# Patient Record
Sex: MALE | Race: WHITE | Employment: FULL TIME | URBAN - METROPOLITAN AREA
[De-identification: names, ages, dates, MRNs, and addresses within clinical notes are randomized per-mention and may not be internally consistent; named-entity substitution may affect disease eponyms.]

---

## 2020-07-16 ENCOUNTER — EMPLOYEE WELLNESS (OUTPATIENT)
Dept: OTHER | Age: 30
End: 2020-07-16

## 2020-07-16 ENCOUNTER — OFFICE VISIT (OUTPATIENT)
Dept: FAMILY MEDICINE CLINIC | Age: 30
End: 2020-07-16
Payer: COMMERCIAL

## 2020-07-16 VITALS
BODY MASS INDEX: 30.49 KG/M2 | OXYGEN SATURATION: 98 % | HEART RATE: 66 BPM | TEMPERATURE: 97.3 F | SYSTOLIC BLOOD PRESSURE: 102 MMHG | DIASTOLIC BLOOD PRESSURE: 68 MMHG | WEIGHT: 201.2 LBS | RESPIRATION RATE: 16 BRPM | HEIGHT: 68 IN

## 2020-07-16 LAB
CHOLESTEROL, TOTAL: 152 MG/DL (ref 0–199)
FASTING: YES
GLUCOSE BLD-MCNC: 95 MG/DL (ref 74–109)
HDLC SERPL-MCNC: 51 MG/DL (ref 40–90)
LDL CHOLESTEROL CALCULATED: 86 MG/DL
TRIGL SERPL-MCNC: 73 MG/DL (ref 0–199)

## 2020-07-16 PROCEDURE — 99202 OFFICE O/P NEW SF 15 MIN: CPT | Performed by: FAMILY MEDICINE

## 2020-07-16 RX ORDER — ACETAMINOPHEN 160 MG
TABLET,DISINTEGRATING ORAL DAILY
COMMUNITY

## 2020-07-16 RX ORDER — CHLORAL HYDRATE 500 MG
3000 CAPSULE ORAL 3 TIMES DAILY
COMMUNITY
End: 2021-03-22 | Stop reason: ALTCHOICE

## 2020-07-16 RX ORDER — FLUOXETINE HYDROCHLORIDE 40 MG/1
40 CAPSULE ORAL DAILY
COMMUNITY
End: 2020-07-16 | Stop reason: SDUPTHER

## 2020-07-16 RX ORDER — FLUOXETINE HYDROCHLORIDE 40 MG/1
40 CAPSULE ORAL DAILY
Qty: 90 CAPSULE | Refills: 3 | Status: SHIPPED | OUTPATIENT
Start: 2020-07-16 | End: 2021-08-09

## 2020-07-16 SDOH — ECONOMIC STABILITY: FOOD INSECURITY: WITHIN THE PAST 12 MONTHS, YOU WORRIED THAT YOUR FOOD WOULD RUN OUT BEFORE YOU GOT MONEY TO BUY MORE.: NEVER TRUE

## 2020-07-16 SDOH — ECONOMIC STABILITY: INCOME INSECURITY: HOW HARD IS IT FOR YOU TO PAY FOR THE VERY BASICS LIKE FOOD, HOUSING, MEDICAL CARE, AND HEATING?: NOT HARD AT ALL

## 2020-07-16 SDOH — ECONOMIC STABILITY: FOOD INSECURITY: WITHIN THE PAST 12 MONTHS, THE FOOD YOU BOUGHT JUST DIDN'T LAST AND YOU DIDN'T HAVE MONEY TO GET MORE.: NEVER TRUE

## 2020-07-16 SDOH — ECONOMIC STABILITY: TRANSPORTATION INSECURITY
IN THE PAST 12 MONTHS, HAS THE LACK OF TRANSPORTATION KEPT YOU FROM MEDICAL APPOINTMENTS OR FROM GETTING MEDICATIONS?: NO

## 2020-07-16 SDOH — ECONOMIC STABILITY: TRANSPORTATION INSECURITY
IN THE PAST 12 MONTHS, HAS LACK OF TRANSPORTATION KEPT YOU FROM MEETINGS, WORK, OR FROM GETTING THINGS NEEDED FOR DAILY LIVING?: NO

## 2020-07-16 ASSESSMENT — ENCOUNTER SYMPTOMS
COUGH: 0
SHORTNESS OF BREATH: 0
RHINORRHEA: 0
NAUSEA: 0
VOMITING: 0
BACK PAIN: 0
DIARRHEA: 0

## 2020-07-16 ASSESSMENT — PATIENT HEALTH QUESTIONNAIRE - PHQ9
2. FEELING DOWN, DEPRESSED OR HOPELESS: 0
SUM OF ALL RESPONSES TO PHQ QUESTIONS 1-9: 0
SUM OF ALL RESPONSES TO PHQ QUESTIONS 1-9: 0
1. LITTLE INTEREST OR PLEASURE IN DOING THINGS: 0
SUM OF ALL RESPONSES TO PHQ9 QUESTIONS 1 & 2: 0

## 2020-07-16 NOTE — PROGRESS NOTES
2020    Jonel Mejia (:  1990) is a 30 y.o. male, here for evaluation of the following medical concerns:    HPI  Patient has he has a history of generalized anxiety. Takes fluoxetine 40 mg p.o. daily. Feels like this is controlled. Recently moved to the area. Establishing. Review of Systems   Constitutional: Negative for chills and fever. HENT: Negative for ear pain and rhinorrhea. Respiratory: Negative for cough and shortness of breath. Cardiovascular: Negative for chest pain and palpitations. Gastrointestinal: Negative for diarrhea, nausea and vomiting. Endocrine: Negative for cold intolerance and polyuria. Genitourinary: Negative for dysuria and frequency. Musculoskeletal: Negative for arthralgias and back pain. Skin: Negative for rash and wound. Neurological: Negative for weakness and numbness. Hematological: Negative for adenopathy. Does not bruise/bleed easily. Psychiatric/Behavioral: Negative for sleep disturbance. The patient is not nervous/anxious. Prior to Visit Medications    Medication Sig Taking? Authorizing Provider   CREATINE PO Take by mouth Yes Historical Provider, MD   Omega-3 Fatty Acids (FISH OIL) 1000 MG CAPS Take 3,000 mg by mouth 3 times daily Yes Historical Provider, MD   Multiple Vitamin (MULTI VITAMIN DAILY PO) Take by mouth Yes Historical Provider, MD   Cholecalciferol (VITAMIN D3) 50 MCG ( UT) CAPS Take by mouth Yes Historical Provider, MD   ALANINE PO Take by mouth Yes Historical Provider, MD   FLUoxetine (PROZAC) 40 MG capsule Take 1 capsule by mouth daily Yes Silvestre Cardenas DO        No Known Allergies    Past Medical History:   Diagnosis Date    Anxiety     Depression     History of chicken pox        History reviewed. No pertinent surgical history.     Social History     Socioeconomic History    Marital status: Single     Spouse name: Not on file    Number of children: 0    Years of education: Not on file    Highest education level: Doctorate   Occupational History    Not on file   Social Needs    Financial resource strain: Not hard at all   Nicki-Charline insecurity     Worry: Never true     Inability: Never true   [employer] needs     Medical: No     Non-medical: No   Tobacco Use    Smoking status: Never Smoker    Smokeless tobacco: Never Used   Substance and Sexual Activity    Alcohol use: Yes     Comment: occasional    Drug use: Never    Sexual activity: Not on file   Lifestyle    Physical activity     Days per week: Not on file     Minutes per session: Not on file    Stress: Not on file   Relationships    Social connections     Talks on phone: Not on file     Gets together: Not on file     Attends Gnosticist service: Not on file     Active member of club or organization: Not on file     Attends meetings of clubs or organizations: Not on file     Relationship status: Not on file    Intimate partner violence     Fear of current or ex partner: Not on file     Emotionally abused: Not on file     Physically abused: Not on file     Forced sexual activity: Not on file   Other Topics Concern    Not on file   Social History Narrative    Not on file        Family History   Problem Relation Age of Onset    No Known Problems Father     Cancer Paternal Grandfather         melanoma       Vitals:    07/16/20 1016   BP: 102/68   Site: Left Upper Arm   Pulse: 66   Resp: 16   Temp: 97.3 °F (36.3 °C)   TempSrc: Temporal   SpO2: 98%   Weight: 201 lb 3.2 oz (91.3 kg)   Height: 5' 8.11\" (1.73 m)     Estimated body mass index is 30.49 kg/m² as calculated from the following:    Height as of this encounter: 5' 8.11\" (1.73 m). Weight as of this encounter: 201 lb 3.2 oz (91.3 kg). Physical Exam  Constitutional:       Appearance: He is well-developed. HENT:      Head: Normocephalic and atraumatic.       Right Ear: External ear normal.      Left Ear: External ear normal.      Nose: Nose normal.   Eyes:      Pupils: Pupils are equal, round, and reactive to light. Neck:      Musculoskeletal: Normal range of motion and neck supple. Cardiovascular:      Rate and Rhythm: Normal rate and regular rhythm. Heart sounds: Normal heart sounds. Pulmonary:      Effort: Pulmonary effort is normal.      Breath sounds: Normal breath sounds. Abdominal:      General: There is no distension. Palpations: Abdomen is soft. Tenderness: There is no abdominal tenderness. Musculoskeletal: Normal range of motion. Skin:     General: Skin is warm and dry. Neurological:      Mental Status: He is alert and oriented to person, place, and time. Psychiatric:         Behavior: Behavior normal.         Thought Content: Thought content normal.         ASSESSMENT/PLAN:  1. Annual physical exam  Controlled. Continue Prozac 40 mg p.o. daily. Follow-up 6 months    2. Anxiety  Doing well      Return in about 6 months (around 1/16/2021). An  electronic signature was used to authenticate this note.     --Cisco Bruno DO on 7/16/2020 at 10:29 AM

## 2020-10-19 VITALS — BODY MASS INDEX: 30.46 KG/M2 | WEIGHT: 201 LBS

## 2020-12-22 ENCOUNTER — TELEPHONE (OUTPATIENT)
Dept: FAMILY MEDICINE CLINIC | Age: 30
End: 2020-12-22

## 2020-12-22 ENCOUNTER — VIRTUAL VISIT (OUTPATIENT)
Dept: FAMILY MEDICINE CLINIC | Age: 30
End: 2020-12-22
Payer: COMMERCIAL

## 2020-12-22 PROCEDURE — 99213 OFFICE O/P EST LOW 20 MIN: CPT | Performed by: NURSE PRACTITIONER

## 2020-12-22 ASSESSMENT — ENCOUNTER SYMPTOMS
BLOOD IN STOOL: 0
EYE DISCHARGE: 0
COUGH: 0
CONSTIPATION: 0
ABDOMINAL DISTENTION: 0
RHINORRHEA: 0
ANAL BLEEDING: 0
COLOR CHANGE: 0
SORE THROAT: 0
EYE REDNESS: 0
ABDOMINAL PAIN: 0
NAUSEA: 0
DIARRHEA: 0
SHORTNESS OF BREATH: 0

## 2020-12-22 NOTE — LETTER
[address] [address],[address] [facility] [address]  Phone: [phone]  Fax: [phone]    VITOR Garza CNP        December 22, 2020     Patient: Sreekanth Zayas   YOB: 1990   Date of Visit: 12/22/2020       To Whom It May Concern: It is my medical opinion that Sreekanth Zayas may return to work on 12/24/2020. If you have any questions or concerns, please don't hesitate to call.     Sincerely,        VITOR Garza CNP

## 2020-12-22 NOTE — PROGRESS NOTES
[address] [facility]  [phone] (phone)  [phone] (fax)    Visit Date: 12/22/2020    Manuel Manrique is a 35 y.o. male who presents today for:  Chief Complaint   Patient presents with    Letter for School/Work     HPI:     THIS VISIT WAS PERFORMED VIA A SYNCHRONOUS TELECOMMUNICATION SYSTEM. Patient gave consent for synchronous telecommunication visit during YBIPT-54 public health emergency. I was present in my home utilizing EPIC patient was in their home. Visit was started at 37    Needs return to work letter - positive COVID 12/14/2020. Symptoms started 12/13/2020    Has been fever free without the aid of medication. Still having slight congestion. HPI  Health Maintenance   Topic Date Due    Varicella vaccine (1 of 2 - 2-dose childhood series) 09/13/1992    HIV screen  09/13/2006    DTaP/Tdap/Td vaccine (1 - Tdap) 09/13/2010    Flu vaccine (1) 09/01/2020    Hepatitis A vaccine  Aged Out    Hepatitis B vaccine  Aged Out    Hib vaccine  Aged Out    Meningococcal (ACWY) vaccine  Aged Out    Pneumococcal 0-64 years Vaccine  Aged Out     Past Medical History:   Diagnosis Date    Anxiety     Depression     History of chicken pox       History reviewed. No pertinent surgical history.   Family History   Problem Relation Age of Onset    No Known Problems Father     Cancer Paternal Grandfather         melanoma     Social History     Tobacco Use    Smoking status: Never Smoker    Smokeless tobacco: Never Used   Substance Use Topics    Alcohol use: Yes     Comment: occasional      Current Outpatient Medications   Medication Sig Dispense Refill    CREATINE PO Take by mouth      Omega-3 Fatty Acids (FISH OIL) 1000 MG CAPS Take 3,000 mg by mouth 3 times daily      Multiple Vitamin (MULTI VITAMIN DAILY PO) Take by mouth      Cholecalciferol (VITAMIN D3) 50 MCG (2000 UT) CAPS Take by mouth      ALANINE PO Take by mouth  FLUoxetine (PROZAC) 40 MG capsule Take 1 capsule by mouth daily 90 capsule 3     No current facility-administered medications for this visit. No Known Allergies    Subjective:    Review of Systems   Constitutional: Negative for chills, fatigue and fever. HENT: Positive for congestion. Negative for ear pain, postnasal drip, rhinorrhea and sore throat. Eyes: Negative for discharge and redness. Respiratory: Negative for cough and shortness of breath. Cardiovascular: Negative for chest pain and leg swelling. Gastrointestinal: Negative for abdominal distention, abdominal pain, anal bleeding, blood in stool, constipation, diarrhea and nausea. Skin: Negative for color change and rash. Neurological: Negative for facial asymmetry, speech difficulty and weakness. Hematological: Does not bruise/bleed easily. Psychiatric/Behavioral: Negative for agitation and confusion. Objective: There were no vitals filed for this visit. There is no height or weight on file to calculate BMI. Wt Readings from Last 3 Encounters:   07/16/20 201 lb (91.2 kg)   07/16/20 201 lb 3.2 oz (91.3 kg)     BP Readings from Last 3 Encounters:   07/16/20 102/68     Physical Exam  Vitals reviewed: Phone visit. Lab Results   Component Value Date    CHOL 152 07/16/2020    TRIG 73 07/16/2020    HDL 51 07/16/2020    LDLCALC 86 07/16/2020     Assessment:       Diagnosis Orders   1. COVID-19         Plan: Will give return to work note. Return if symptoms worsen or fail to improve. Orders Placed:  No orders of the defined types were placed in this encounter. Medications Prescribed:  No orders of the defined types were placed in this encounter. No future appointments. Patient given educational materials - see patient instructions. Discussed use, benefit, and side effects of prescribedmedications. All patient questions answered. Pt voiced understanding. Reviewed health maintenance. Instructed to continue current medications, diet and exercise. Patient agreed with treatment plan. Follow up as directed. Reyes Christianson is a 35 y.o. male being evaluated by a Virtual Visit (video visit) encounter to address concerns as mentioned above. A caregiver was present when appropriate. Due to this being a TeleHealth encounter (During QZEWN-45 public health emergency). Evaluation of the following organ systems was limited: Vitals/Constitutional/EENT/Resp/CV/GI//MS/Neuro/Skin/Heme-Lymph-Imm. Pursuant to the emergency declaration under the [address] [place] [address] authority and the [employer] Act, this Virtual Visit was conducted with patient's (and/or legal guardian's) consent, to reduce the patient's risk of exposure to COVID-19 and provide necessary medical care. The patient (and/or legal guardian) has also been advised to contact this office for worsening conditions or problems, and seek emergency medical treatment and/or call 911 if deemed necessary. Services were provided through a video synchronous discussion virtually to substitute for in-person clinic visit. Patient and provider were located at their individual homes. --VITOR Hathaway CNP on 12/22/2020 at 1:32 PM    An electronic signature was used to authenticate this note.      Electronically signed by VITOR Hathaway CNP on 12/22/2020 at 1:32 PM

## 2021-03-22 ENCOUNTER — HOSPITAL ENCOUNTER (OUTPATIENT)
Dept: GENERAL RADIOLOGY | Age: 31
Discharge: HOME | End: 2021-03-22
Payer: COMMERCIAL

## 2021-03-22 ENCOUNTER — OFFICE VISIT (OUTPATIENT)
Dept: FAMILY MEDICINE CLINIC | Age: 31
End: 2021-03-22
Payer: COMMERCIAL

## 2021-03-22 ENCOUNTER — HOSPITAL ENCOUNTER (OUTPATIENT)
Age: 31
Discharge: HOME | End: 2021-03-22
Payer: COMMERCIAL

## 2021-03-22 VITALS
DIASTOLIC BLOOD PRESSURE: 70 MMHG | WEIGHT: 224.4 LBS | BODY MASS INDEX: 34.01 KG/M2 | HEART RATE: 100 BPM | RESPIRATION RATE: 16 BRPM | SYSTOLIC BLOOD PRESSURE: 112 MMHG | OXYGEN SATURATION: 99 % | HEIGHT: 68 IN | TEMPERATURE: 97.9 F

## 2021-03-22 DIAGNOSIS — L98.9 SKIN LESION: ICD-10-CM

## 2021-03-22 DIAGNOSIS — S69.90XA INJURY OF WRIST, UNSPECIFIED LATERALITY, INITIAL ENCOUNTER: Primary | ICD-10-CM

## 2021-03-22 DIAGNOSIS — S69.90XA INJURY OF WRIST, UNSPECIFIED LATERALITY, INITIAL ENCOUNTER: ICD-10-CM

## 2021-03-22 PROCEDURE — 73110 X-RAY EXAM OF WRIST: CPT

## 2021-03-22 PROCEDURE — 99213 OFFICE O/P EST LOW 20 MIN: CPT | Performed by: FAMILY MEDICINE

## 2021-03-22 PROCEDURE — 96372 THER/PROPH/DIAG INJ SC/IM: CPT | Performed by: FAMILY MEDICINE

## 2021-03-22 RX ORDER — KETOROLAC TROMETHAMINE 30 MG/ML
30 INJECTION, SOLUTION INTRAMUSCULAR; INTRAVENOUS ONCE
Status: COMPLETED | OUTPATIENT
Start: 2021-03-22 | End: 2021-03-22

## 2021-03-22 RX ORDER — MELOXICAM 15 MG/1
15 TABLET ORAL DAILY
Qty: 30 TABLET | Refills: 3 | Status: SHIPPED | OUTPATIENT
Start: 2021-03-22 | End: 2021-10-18

## 2021-03-22 RX ADMIN — KETOROLAC TROMETHAMINE 30 MG: 30 INJECTION, SOLUTION INTRAMUSCULAR; INTRAVENOUS at 14:48

## 2021-03-22 ASSESSMENT — PATIENT HEALTH QUESTIONNAIRE - PHQ9
SUM OF ALL RESPONSES TO PHQ9 QUESTIONS 1 & 2: 0
SUM OF ALL RESPONSES TO PHQ QUESTIONS 1-9: 0

## 2021-03-22 NOTE — LETTER
[address] [address],[address] [facility] [address] [place] [address]  Phone: [phone]  Fax: [phone]    Khanh Crowe DO        March 22, 2021     Patient: Vale Willard   YOB: 1990   Date of Visit: 3/22/2021       To Whom it May Concern:    Vale Willard was seen in my clinic on 3/22/2021. He may return to work on 3/22/21 with restriction regarding left wrist and hand use. Should be limited to no activity with left hand involving pushing, pulling, or lifting. If you have any questions or concerns, please don't hesitate to call.     Sincerely,         Khanh Crowe DO

## 2021-03-22 NOTE — PROGRESS NOTES
After obtaining consent, and per orders of Dr. Madison Frazier, injection of Torodol was given IM in Right upper quad. gluteus by Maria Luisa Wilkerson. Patient tolerated well and was instructed to remain in clinic for 20 minutes afterwards, and to report any adverse reaction to me immediately. Melissa Rodas left office with no apparent reaction. Administrations This Visit     ketorolac (TORADOL) injection 30 mg     Admin Date  03/22/2021  14:48 Action  Given Dose  30 mg Route  Intramuscular Site  Dorsogluteal Right Administered By  Maria Luisa Wilkerson LPN    Ordering Provider: DO Berlin Akins [address]: [id number]    Lot#: [id number]    : [address] [facility]    Patient Supplied?: No    Comments: Patient tolerated well.  Given IM in Right Gluteal. Exp. 03/22

## 2021-03-22 NOTE — PROGRESS NOTES
Health Maintenance Due   Topic Date Due    Hepatitis C screen  Never done Pended    Varicella vaccine (1 of 2 - 2-dose childhood series) Never done    HIV screen  Never done Pended    COVID-19 Vaccine (1) Never done    DTaP/Tdap/Td vaccine (1 - Tdap) Never done    Flu vaccine (1) Never done

## 2021-03-22 NOTE — PROGRESS NOTES
Juan Soto (:  1990) is a 35 y.o. male,Established patient, here for evaluation of the following chief complaint(s):  Wrist Pain (Since fall on Saturday) and Nevus (check back of neck)      ASSESSMENT/PLAN:  1. Injury of wrist, unspecified laterality, initial encounterx-ray ordered. RICE therapy recommended. Mobic 15 g p.o. daily. .  2. Skin lesionskin tag. Reassured. Return if symptoms worsen or fail to improve. SUBJECTIVE/OBJECTIVE:  HPI  Patient reports wrist pain since a fall on Saturday. Outstretched left hand. Pain in the anterior left wrist.  Pain in the carpal tunnel area with swelling and tightness. Worse with movement. ASA is helping. Tylenol didn't help. Icing and heat. No other injuries or neurological symptoms. He also notes that he has a skin lesion on the back of his neck that he would like looked at. Present for years. Family member notes that it's changing. FH melanoma. Physical Exam  Swelling and ecchymosis over the anterior left wrist.  Some bony tenderness over the anterior wrist.  Normal strength but inhibited by pain. An electronic signature was used to authenticate this note.     --Salma Reyez, DO

## 2021-03-25 DIAGNOSIS — S62.102P CLOSED FRACTURE OF LEFT WRIST WITH MALUNION, SUBSEQUENT ENCOUNTER: Primary | ICD-10-CM

## 2021-04-07 ENCOUNTER — HOSPITAL ENCOUNTER (OUTPATIENT)
Dept: MRI IMAGING | Age: 31
Discharge: HOME | End: 2021-04-07
Payer: COMMERCIAL

## 2021-04-07 DIAGNOSIS — S52.512D DISPLACED FRACTURE OF LEFT RADIAL STYLOID PROCESS, SUBSEQUENT ENCOUNTER FOR CLOSED FRACTURE WITH ROUTINE HEALING: ICD-10-CM

## 2021-04-07 PROCEDURE — 73221 MRI JOINT UPR EXTREM W/O DYE: CPT

## 2021-08-09 RX ORDER — FLUOXETINE HYDROCHLORIDE 40 MG/1
CAPSULE ORAL
Qty: 90 CAPSULE | Refills: 3 | Status: SHIPPED | OUTPATIENT
Start: 2021-08-09 | End: 2022-02-14

## 2021-08-09 NOTE — TELEPHONE ENCOUNTER
Patient's last appointment was : 3/22/2021  Patient's next appointment is : Visit date not found  Last refilled: 7/16/2020

## 2021-10-18 ENCOUNTER — OFFICE VISIT (OUTPATIENT)
Dept: FAMILY MEDICINE CLINIC | Age: 31
End: 2021-10-18
Payer: COMMERCIAL

## 2021-10-18 ENCOUNTER — HOSPITAL ENCOUNTER (OUTPATIENT)
Age: 31
Discharge: HOME | End: 2021-10-18
Payer: COMMERCIAL

## 2021-10-18 VITALS
TEMPERATURE: 97.8 F | HEIGHT: 68 IN | DIASTOLIC BLOOD PRESSURE: 84 MMHG | BODY MASS INDEX: 34.34 KG/M2 | SYSTOLIC BLOOD PRESSURE: 122 MMHG | HEART RATE: 91 BPM | OXYGEN SATURATION: 98 % | WEIGHT: 226.6 LBS

## 2021-10-18 DIAGNOSIS — R53.83 FATIGUE, UNSPECIFIED TYPE: Primary | ICD-10-CM

## 2021-10-18 DIAGNOSIS — R63.5 WEIGHT GAIN: ICD-10-CM

## 2021-10-18 DIAGNOSIS — F41.9 ANXIETY: ICD-10-CM

## 2021-10-18 DIAGNOSIS — N52.9 ERECTILE DYSFUNCTION, UNSPECIFIED ERECTILE DYSFUNCTION TYPE: ICD-10-CM

## 2021-10-18 DIAGNOSIS — R53.83 FATIGUE, UNSPECIFIED TYPE: ICD-10-CM

## 2021-10-18 LAB
BASOPHILS # BLD: 0.3 %
BASOPHILS ABSOLUTE: 0 THOU/MM3 (ref 0–0.1)
EOSINOPHIL # BLD: 0.9 %
EOSINOPHILS ABSOLUTE: 0.1 THOU/MM3 (ref 0–0.4)
ERYTHROCYTE [DISTWIDTH] IN BLOOD BY AUTOMATED COUNT: 12.3 % (ref 11.5–14.5)
ERYTHROCYTE [DISTWIDTH] IN BLOOD BY AUTOMATED COUNT: 39.5 FL (ref 35–45)
HCT VFR BLD CALC: 43.9 % (ref 42–52)
HEMOGLOBIN: 15.4 GM/DL (ref 14–18)
HIV AG/AB: NONREACTIVE
IMMATURE GRANS (ABS): 0.03 THOU/MM3 (ref 0–0.07)
IMMATURE GRANULOCYTES: 0.3 %
LYMPHOCYTES # BLD: 30.9 %
LYMPHOCYTES ABSOLUTE: 3.1 THOU/MM3 (ref 1–4.8)
MCH RBC QN AUTO: 31.2 PG (ref 26–33)
MCHC RBC AUTO-ENTMCNC: 35.1 GM/DL (ref 32.2–35.5)
MCV RBC AUTO: 88.9 FL (ref 80–94)
MONOCYTES # BLD: 6.2 %
MONOCYTES ABSOLUTE: 0.6 THOU/MM3 (ref 0.4–1.3)
NUCLEATED RED BLOOD CELLS: 0 /100 WBC
PLATELET # BLD: 356 THOU/MM3 (ref 130–400)
PMV BLD AUTO: 9.1 FL (ref 9.4–12.4)
RBC # BLD: 4.94 MILL/MM3 (ref 4.7–6.1)
REASON FOR REJECTION: NORMAL
REJECTED TEST: NORMAL
SEG NEUTROPHILS: 61.4 %
SEGMENTED NEUTROPHILS ABSOLUTE COUNT: 6.1 THOU/MM3 (ref 1.8–7.7)
TSH SERPL DL<=0.05 MIU/L-ACNC: 2.06 UIU/ML (ref 0.4–4.2)
WBC # BLD: 9.9 THOU/MM3 (ref 4.8–10.8)

## 2021-10-18 PROCEDURE — 99214 OFFICE O/P EST MOD 30 MIN: CPT | Performed by: FAMILY MEDICINE

## 2021-10-18 PROCEDURE — 84443 ASSAY THYROID STIM HORMONE: CPT

## 2021-10-18 PROCEDURE — 87389 HIV-1 AG W/HIV-1&-2 AB AG IA: CPT

## 2021-10-18 PROCEDURE — 85025 COMPLETE CBC W/AUTO DIFF WBC: CPT

## 2021-10-18 PROCEDURE — 80053 COMPREHEN METABOLIC PANEL: CPT

## 2021-10-18 PROCEDURE — 36415 COLL VENOUS BLD VENIPUNCTURE: CPT

## 2021-10-18 SDOH — ECONOMIC STABILITY: FOOD INSECURITY: WITHIN THE PAST 12 MONTHS, THE FOOD YOU BOUGHT JUST DIDN'T LAST AND YOU DIDN'T HAVE MONEY TO GET MORE.: NEVER TRUE

## 2021-10-18 SDOH — ECONOMIC STABILITY: FOOD INSECURITY: WITHIN THE PAST 12 MONTHS, YOU WORRIED THAT YOUR FOOD WOULD RUN OUT BEFORE YOU GOT MONEY TO BUY MORE.: NEVER TRUE

## 2021-10-18 ASSESSMENT — SOCIAL DETERMINANTS OF HEALTH (SDOH): HOW HARD IS IT FOR YOU TO PAY FOR THE VERY BASICS LIKE FOOD, HOUSING, MEDICAL CARE, AND HEATING?: NOT HARD AT ALL

## 2021-10-18 NOTE — PROGRESS NOTES
Health Maintenance Due   Topic Date Due    Hepatitis C screen  Never done    Varicella vaccine (1 of 2 - 2-dose childhood series) Never done    COVID-19 Vaccine (1) Never done    HIV screen  Never done    DTaP/Tdap/Td vaccine (1 - Tdap) Never done    Flu vaccine (1) Never done   Patient is here for follow up; c/o fatigue, sob-only with exertion, more than usual winded after one flight of stairs, not at rest;

## 2021-10-18 NOTE — PROGRESS NOTES
[address] [facility] [place] (:  1990) is a 28 y.o. male,Established patient, here for evaluation of the following chief complaint(s):  Discuss Labs (wants labs CBC, CMP, TSH with reflux, HIV; ), Fatigue (starting a few months ago; sleepier than he should be; 6-8hrs of sleep each night; not usually any trouble sleeping; caffine use), Shortness of Breath (only upon exertion), Erectile Dysfunction, and Weight Loss (wants to discuss)         ASSESSMENT/PLAN:  1. Fatigue, unspecified type  -     Comprehensive Metabolic Panel; Future  -     CBC Auto Differential; Future  -     TSH with Reflex; Future  -     HIV Screen; Future  2. Erectile dysfunction, unspecified erectile dysfunction type  3. Weight gain  4. Anxiety  Offer patient testosterone testing which she politely declined. Discussed weight loss medications which he will try if not successful with lifestyle modifications. Labs ordered as above. Patient will cut down on caffeine use. Anxiety is controlled with Prozac 40 mg p.o. daily    Return in about 6 months (around 2022). Subjective   SUBJECTIVE/OBJECTIVE:  HPI  Patient reports fatigue which has been going on for the past 6 months. No significant alterations in review of systems. Reports that he is sleeping well. Would like to have some labs drawn. Denies palpitations. He notes weight gain over the past several months. Has previously tried numerous which she is going to call back to. Would like to hold off on any medications at this time. Feels as if he has some measure of food addiction. Does feel associated dyspnea on exertion. Erectile dysfunction issues lasting for the past year and a half. They have been stable.           ROS:  denies CP, SOB, N/V/D    Past Medical History:   Diagnosis Date    Anxiety     Candida albicans infection     uses OTC medications    Carpal tunnel syndrome     sees doctor at [facility]; left wrist; Dr. Jacquelyn Chandler Depression     History of chicken pox      No Known Allergies    Current Outpatient Medications   Medication Sig Dispense Refill    FLUoxetine (PROZAC) 40 MG capsule TAKE 1 CAPSULE BY MOUTH ONE TIME A DAY 90 capsule 3    CREATINE PO Take by mouth Couple times per week      Multiple Vitamin (MULTI VITAMIN DAILY PO) Take by mouth daily Taking with B6-in vitamin      Cholecalciferol (VITAMIN D3) 50 MCG (2000 UT) CAPS Take by mouth daily        No current facility-administered medications for this visit. Objective   Physical Exam  Cardiovascular:      Rate and Rhythm: Normal rate and regular rhythm. Pulmonary:      Effort: Pulmonary effort is normal.      Breath sounds: Normal breath sounds. Vitals:    10/18/21 1106   BP: 122/84   Pulse: 91   Temp: 97.8 °F (36.6 °C)   SpO2: 98%          An electronic signature was used to authenticate this note.     Rosalina Braga DO +weakness +dizziness

## 2022-02-02 ENCOUNTER — TELEPHONE (OUTPATIENT)
Dept: FAMILY MEDICINE CLINIC | Age: 32
End: 2022-02-02

## 2022-02-02 ENCOUNTER — HOSPITAL ENCOUNTER (OUTPATIENT)
Age: 32
Discharge: HOME | End: 2022-02-02
Payer: COMMERCIAL

## 2022-02-02 ENCOUNTER — OFFICE VISIT (OUTPATIENT)
Dept: FAMILY MEDICINE CLINIC | Age: 32
End: 2022-02-02
Payer: COMMERCIAL

## 2022-02-02 VITALS
BODY MASS INDEX: 35.92 KG/M2 | RESPIRATION RATE: 14 BRPM | WEIGHT: 237 LBS | HEIGHT: 68 IN | SYSTOLIC BLOOD PRESSURE: 126 MMHG | HEART RATE: 92 BPM | OXYGEN SATURATION: 97 % | TEMPERATURE: 98.8 F | DIASTOLIC BLOOD PRESSURE: 72 MMHG

## 2022-02-02 DIAGNOSIS — R05.9 COUGH: ICD-10-CM

## 2022-02-02 DIAGNOSIS — Z20.818 PERTUSSIS EXPOSURE: ICD-10-CM

## 2022-02-02 DIAGNOSIS — R05.9 COUGH: Primary | ICD-10-CM

## 2022-02-02 LAB
Lab: NORMAL
QC PASS/FAIL: NORMAL
SARS-COV-2 RDRP RESP QL NAA+PROBE: NEGATIVE

## 2022-02-02 PROCEDURE — 99213 OFFICE O/P EST LOW 20 MIN: CPT | Performed by: STUDENT IN AN ORGANIZED HEALTH CARE EDUCATION/TRAINING PROGRAM

## 2022-02-02 PROCEDURE — 87801 DETECT AGNT MULT DNA AMPLI: CPT

## 2022-02-02 PROCEDURE — 86615 BORDETELLA ANTIBODY: CPT

## 2022-02-02 PROCEDURE — 87635 SARS-COV-2 COVID-19 AMP PRB: CPT | Performed by: STUDENT IN AN ORGANIZED HEALTH CARE EDUCATION/TRAINING PROGRAM

## 2022-02-02 PROCEDURE — 36415 COLL VENOUS BLD VENIPUNCTURE: CPT

## 2022-02-02 RX ORDER — AZITHROMYCIN 250 MG/1
250 TABLET, FILM COATED ORAL SEE ADMIN INSTRUCTIONS
Qty: 6 TABLET | Refills: 0 | Status: SHIPPED | OUTPATIENT
Start: 2022-02-02 | End: 2022-02-07

## 2022-02-02 NOTE — PROGRESS NOTES
Louann Dill is a 28 y.o. male who presents today for:  Chief Complaint   Patient presents with    Cough     Pt presents c/o cough, fatigue, and SOB that started on 1/29. Pt has not tried or taken anything for it. He was exposed to Pertussis. HPI:   Symptoms started 1/29  Cough, myalgias, SOB, fatigue  Some chest pain when he coughs  Overall this feels like a cold, not super bothered by it     COVID test on Sunday night  No results back yet     Close contact tested positive for pertussis 2 days ago  Off work due to pertussis exposure and symptoms          Food Insecurity: No Food Insecurity    Worried About Running Out of Food in the Last Year: Never true    Hay of Food in the Last Year: Never true     Health Maintenance reviewed -   Health Maintenance   Topic Date Due    Hepatitis C screen  Never done    Varicella vaccine (1 of 2 - 2-dose childhood series) Never done    COVID-19 Vaccine (1) Never done    DTaP/Tdap/Td vaccine (1 - Tdap) Never done    Flu vaccine (1) Never done    Depression Screen  03/22/2022    HIV screen  Completed    Hepatitis A vaccine  Aged Out    Hepatitis B vaccine  Aged Out    Hib vaccine  Aged Out    Meningococcal (ACWY) vaccine  Aged Out    Pneumococcal 0-64 years Vaccine  Aged Out     Current Outpatient Medications   Medication Sig Dispense Refill    Multiple Vitamin (MULTI VITAMIN DAILY PO) Take by mouth daily Taking with B6-in vitamin      Cholecalciferol (VITAMIN D3) 50 MCG (2000 UT) CAPS Take by mouth daily       FLUoxetine (PROZAC) 40 MG capsule TAKE 1 CAPSULE BY MOUTH ONE TIME A DAY 90 capsule 3    CREATINE PO Take by mouth Couple times per week       No current facility-administered medications for this visit.      Social History     Tobacco Use    Smoking status: Never Smoker    Smokeless tobacco: Never Used   Substance Use Topics    Alcohol use: Yes     Comment: occasional      Subjective:   Review of Systems  See above  Objective:     Vitals: 02/02/22 1415   BP: 126/72   Pulse: 92   Resp: 14   Temp: 98.8 °F (37.1 °C)   SpO2: 97%   Weight: 237 lb (107.5 kg)   Height: 5' 8\" (1.727 m)     Body mass index is 36.04 kg/m². Wt Readings from Last 3 Encounters:   02/02/22 237 lb (107.5 kg)   10/18/21 226 lb 9.6 oz (102.8 kg)   03/22/21 224 lb 6.4 oz (101.8 kg)     BP Readings from Last 3 Encounters:   02/02/22 126/72   10/18/21 122/84   03/22/21 112/70     Physical Exam  Vitals and nursing note reviewed. Constitutional:       General: He is not in acute distress. Appearance: He is well-developed. He is not diaphoretic. HENT:      Right Ear: There is impacted cerumen. Left Ear: There is impacted cerumen. Nose: Congestion present. Mouth/Throat:      Mouth: Mucous membranes are moist.      Pharynx: No oropharyngeal exudate. Cardiovascular:      Rate and Rhythm: Normal rate and regular rhythm. Heart sounds: Normal heart sounds. No murmur heard. Pulmonary:      Effort: Pulmonary effort is normal.      Breath sounds: Normal breath sounds. No wheezing. Neurological:      Mental Status: He is alert. Psychiatric:         Thought Content: Thought content normal.         Judgment: Judgment normal.         Assessment / Plan:   Sarath Mcgill was seen today for cough. 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 Given his symptoms, we will test him for pertussis to be certain.  We also tested patient for COVID given he has not received his results for 3 days which is unusual. Patient tested negative for COVID. Despite not knowing for certain patients pertussis status, we will treat empirically with a zpack to prevent possible further spread and to get patient back to working/learning. Work note provided     Patient to follow-up in 2-4 weeks for general visit      No follow-ups on file. Medications Prescribed:  No orders of the defined types were placed in this encounter. No future appointments. Patient given educational materials - see patient instructions. Discussed use, benefit, and side effects of prescribed medications. All patient questions answered. Pt voiced understanding. Instructed to continue current medications, diet and exercise. Patient agreed with treatment plan. Follow up as directed. Part of this visit was documented via [id number] technology, please excuse any errors.      Electronically signed by Lyndon Cordoba MD on 2/2/2022 at 2:48 PM

## 2022-02-02 NOTE — TELEPHONE ENCOUNTER
Francisco from lab is calling to see if for the [facility] ordered today 02/02/2022 if you want that to be a swab or blood test. Please Advise and call Francisco at [phone].

## 2022-02-02 NOTE — PROGRESS NOTES
S: 28 y.o. male with   Chief Complaint   Patient presents with    Cough     Pt presents c/o cough, fatigue, and SOB that started on 1/29. Pt has not tried or taken anything for it. He was exposed to Pertussis. HPI: please see resident note for HPI and ROS. BP Readings from Last 3 Encounters:   02/02/22 126/72   10/18/21 122/84   03/22/21 112/70     Wt Readings from Last 3 Encounters:   02/02/22 237 lb (107.5 kg)   10/18/21 226 lb 9.6 oz (102.8 kg)   03/22/21 224 lb 6.4 oz (101.8 kg)       O: VS:  height is 5' 8\" (1.727 m) and weight is 237 lb (107.5 kg). His temperature is 98.8 °F (37.1 °C). His blood pressure is 126/72 and his pulse is 92. His respiration is 14 and oxygen saturation is 97%. Diagnosis Orders   1. Cough     2. Pertussis exposure         Plan:  pertussis pcr and culture. Start zpack. covid negative. Health Maintenance Due   Topic Date Due    Hepatitis C screen  Never done    Varicella vaccine (1 of 2 - 2-dose childhood series) Never done    COVID-19 Vaccine (1) Never done    DTaP/Tdap/Td vaccine (1 - Tdap) Never done    Flu vaccine (1) Never done       Attending Physician Statement  I have discussed the case, including pertinent history and exam findings with the resident. I agree with the documented assessment and plan as documented by the resident.         Heather Zuniga,  2/2/2022 2:33 PM

## 2022-02-02 NOTE — LETTER
[address] [place] [address],[address] [facility] [address] [place] [address]  Phone: [phone]  Fax: [phone]    Rochelle Jorge MD        February 2, 2022     Patient: Ivan Dupont   YOB: 1990   Date of Visit: 2/2/2022       To Whom It May Concern: It is my medical opinion that Ivan Dupont may return to work on 2/7/22. Patient to start Z-pack 2/2/22 which will finish 2/6/22 given patient 5 days worth of treatment for pertussis. If you have any questions or concerns, please don't hesitate to call.     Sincerely,        Rochelle Jorge MD

## 2022-02-03 LAB — BORDETELLA PERTUSSIS, PCR: NORMAL

## 2022-02-08 ENCOUNTER — TELEPHONE (OUTPATIENT)
Dept: FAMILY MEDICINE CLINIC | Age: 32
End: 2022-02-08

## 2022-02-09 ENCOUNTER — TELEPHONE (OUTPATIENT)
Dept: FAMILY MEDICINE CLINIC | Age: 32
End: 2022-02-09

## 2022-02-09 LAB
B PERTUSSIS IGG FHA: POSITIVE
B PERTUSSIS IGG INTERP: ABNORMAL
B PERTUSSIS IGG PT100: NEGATIVE
B PERTUSSIS IGG PT: POSITIVE
Lab: NEGATIVE

## 2022-02-09 NOTE — TELEPHONE ENCOUNTER
----- Message from Becka Coello MD sent at 2/9/2022  8:05 AM EST -----  Past immunization/infection  Unlikely to have had recent infections

## 2022-02-14 ENCOUNTER — INITIAL CONSULT (OUTPATIENT)
Dept: PULMONOLOGY | Age: 32
End: 2022-02-14
Payer: COMMERCIAL

## 2022-02-14 VITALS
BODY MASS INDEX: 35.01 KG/M2 | HEART RATE: 118 BPM | DIASTOLIC BLOOD PRESSURE: 72 MMHG | HEIGHT: 69 IN | TEMPERATURE: 97.7 F | SYSTOLIC BLOOD PRESSURE: 128 MMHG | WEIGHT: 236.4 LBS | OXYGEN SATURATION: 97 %

## 2022-02-14 DIAGNOSIS — R06.83 SNORING: ICD-10-CM

## 2022-02-14 DIAGNOSIS — G47.10 HYPERSOMNIA: Primary | ICD-10-CM

## 2022-02-14 PROCEDURE — 99203 OFFICE O/P NEW LOW 30 MIN: CPT | Performed by: INTERNAL MEDICINE

## 2022-02-14 NOTE — PROGRESS NOTES
[place] [facility], Sleep and [facility] initial consultation note    Pearson Peabody                                                Chief complaint: Pearson Peabody is a 28 y. o.oldmale came for further evaluation regarding his hypersomnia and  ?sleep apnea  with referral from     [place]:    Sleep/Wake schedule:  Usual time to go to bed during the work/regular day of week: 10 PM   Usual time to wake up during the work//regular day of week: 5 AM  Over the weekends his sleep schedule:  [x]phase delayed. He feels better on weekends whenever he sleeps long time  He usually falls a sleep in less than: 5 minutes  He takes naps: Yes. Number of naps per week: 2 times. He takes naps or the weekends  During each nap he spends a total of: 1 hour  The naps were reported as refreshing: Yes. Sleep Hygiene:  Is the temperature and evironment in his bed room is acceptable to him: Yes. He watches Television in his bed room: Yes. He read books, study, pay bills etc in the bed: Yes. He works with his smart phone and computer before going to sleep  Frequency He wake up during night/sleep: 4 times  Majority of nocturnal awakenings are for urination: Yes. He goes to restroom 2 times only during his nocturnal awakenings  Difficulty in falling back to sleep after nocturnal awakenings: yes. Occasionally  . Do you drink coffee: Yes. He drinks 1 cup of coffee in the morning  Do you drink caffeinated beverages i.e sodas: He drinks a bang energy drinks 2 cans/day or in the morning 1 in the evening. His total caffeine intake is between 600 mg to 900 mg/day. Do you drink tea: No.     Do you drink alcoholic beverages: Yes.   He drinks 1 to 2 glasses of wine per week on weekends  History of recreational drug use: No    Social History     Tobacco Use    Smoking status: Never Smoker    Smokeless tobacco: Never Used   Vaping Use    Vaping Use: Never used   Substance Use Topics    Alcohol use: Yes     Comment: occasional    Drug use: Never       Sleep apnea symptoms:  Noticed to have loud snoring:Yes. Noted by his family member-significant other  Witnessed apneas during sleep noticed: No.   History of choking and gasping sensation at night time: Yes. Occasionally he wakes up with a choking sensation in his throat  History of headaches in the morning:No.   History of dry mouth in the morning: Yes. History of palpitations during night time/nocturnal awakenings: No.   History of sweating during night time/nocturnal awakenings: No.      General:  History of head injury in the past: No.   History of seizures: No  Rest less legs syndrome symptoms:NO  History suggestive of periodic limb movements during sleep: NO  History suggestive of hypnagogic hallucinations: NO  History suggestive of hypnopompic hallucinations: NO  History suggestive of sleep talking:NO  History suggestive of sleep walking:NO  History suggestive of bruxism: NO    History suggestive of cataplexy: NO  History suggestive of sleep paralysis: NO    He was noted to have a jerking movements I.e myoclonic jerks    Family history of sleep disorders:  Family history of obstructive sleep apnea: NO.  Family history of Narcolepsy: NO.  Family history of Rest less legs syndrome : NO.      History regarding old sleep studies:  Prior history of sleep study: No.  Using CPAP device: No.  Currently using home Oxygen: NO.       Patient considerations:  Is the patient is ambulatory: Yes  Patient is currently using: None of these Wheelchair, Adron Marana or [employer].   Para/Quadriplegic: NO  Hearing deficit : NO  Claustrophobic: NO  MDD : NO  Blind: NO  Incontinent: NO  Para/Quadraplegi: NO.   Need transportation to and from Sleep Center:NO      Social History:  Social History     Tobacco Use    Smoking status: Never Smoker    Smokeless tobacco: Never Used   Vaping Use    Vaping Use: Never used   Substance Use Topics    Alcohol use: Yes     Comment: occasional  Drug use: Never   . He is currently working: Yes. 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  He usually goes to his work at: 8 AM  He completes his work at: 7 PM.  He works at nighttime sometimes as a part of his night duty. Past Medical History:   Diagnosis Date    Anxiety     Candida albicans infection     uses OTC medications    Carpal tunnel syndrome     sees doctor at [facility]; left wrist; Dr. Mojgan Tavera Depression     History of chicken pox        No past surgical history on file. No Known Allergies    Current Outpatient Medications   Medication Sig Dispense Refill    CREATINE PO Take by mouth Couple times per week      Multiple Vitamin (MULTI VITAMIN DAILY PO) Take by mouth daily Taking with B6-in vitamin      Cholecalciferol (VITAMIN D3) 50 MCG (2000 UT) CAPS Take by mouth daily        No current facility-administered medications for this visit. Family History   Problem Relation Age of Onset    No Known Problems Father     Cancer Paternal Grandfather         melanoma        Review of Systems:   General/Constitutional: He gained approximately 36 pounds of weight in the last 1.5 years with normal appetite. no fever or chills. HENT: Negative. Eyes: Negative. Upper respiratory tract: No nasal stuffiness or post nasal drip. Lower respiratory tract/ lungs: Occasional cough with no sputum production. No hemoptysis. Exertional dyspnea occasionally. Cardiovascular: No palpitations or chest pain. Gastrointestinal: No nausea or vomiting. Neurological: No focal neurologiacal weakness. Extremities: No edema. Musculoskeletal: No complaints. Genitourinary: No complaints. Hematological: Negative. Psychiatric/Behavioral: Negative. Skin: No itching.     /72 (Site: Left Lower Arm, Position: Sitting, Cuff Size: Medium Adult)   Pulse 118   Temp 97.7 °F (36.5 °C)   Ht 5' 8.5\" (1.74 m)   Wt 236 lb 6.4 oz (107.2 kg)   SpO2 97% Comment: room air at rest  BMI 35.42 kg/m²   BMI:  Body mass index is 35.42 kg/m². Mallampati airway Class:4  Neck Circumference:.15.5 Inches  [place] sleepiness score 2/14/22: 13   Sleep apnea quality of life questionnaire:52    Physical Exam:   Nursing note and vitals reviewed. Constitutional: Patient appears moderately built and moderately nourished. No distress. Patient is oriented to person, place, and time. HENT:   Head: Normocephalic and atraumatic. Right Ear: External ear normal.   Left Ear: External ear normal.   Mouth/Throat: Oropharynx is clear and moist.  No oral thrush. Eyes: Conjunctivae are normal. Pupils are equal, round, and reactive to light. No scleral icterus. Neck: Neck supple. No JVD present. No tracheal deviation present. Cardiovascular: Tachycardia ,regular rhythm, normal heart sounds. No murmur heard. Pulmonary/Chest: Effort normal and breath sounds normal. No stridor. No respiratory distress. No wheezes. No rales. Patient exhibits no tenderness. Abdominal: Soft. Patient exhibits no distension. No tenderness. Musculoskeletal: Normal range of motion. Extremities: Patient exhibits no edema and no tenderness. Lymphadenopathy:  No cervical adenopathy. Neurological: Patient is alert and oriented to person, place, and time. Skin: Skin is warm and dry. Patient is not diaphoretic. Psychiatric: Patient  has a normal mood and affect. Patient behavior is normal.     Diagnostic Data:  None related sleep. Assessment:  -Snoring without witnessed apneas,frequent nocturnal awakenings and excessive daytime sleepiness to evaluate for obstructive sleep apnea. -Inadequate sleep hygiene.  -Hypersomnia ( Excessive daytime sleepiness) may be due to obstructive sleep apnea Vs Inadequate sleep hygiene.  -He was noted to have myoclonic jerks during daytime.  -History of depression in the past.  He is currently not taking medications.  -Tachycardia with regular rhythm- ?   Sinus tachycardia most likely to excessive caffeine intake. Recommendations/Plan:  -He was advised to submit 2 weeks of sleep dairy for review of his sleep schedule.  -Schedule patient for nocturnal polysomnogram (Sleep study) followed by Multiple sleep latency test at [facility] sleep lab to further evaluate for Narcolepsy.  -I had a discussion with patient regarding avialable treatment options for his sleep disorder breathing including but not limited to CPAP titration in the sleep lab Vs.Dental appliance placement with referral to a local dentist Vs other available surgical options including Uvulopalatopharyngoplasty, maxillomandibular ostomy and tracheostomy as last option. At the end of discussion, he is not decided on his treatment if he found to have obstructive sleep apnea at this time.  -We will see Konrad Albert back in 1week after the sleep study to go over the sleep study results and further management options.  -He was offered a cardiology referral for further evaluation of his tachycardia. However, he is going to work on his caffeine intake. He is going to discuss with his family physician Dr. Scotty Apley, MD regarding his tachycardia. He had an appointment with Dr. Scotty Apley, MD in the next 2 days. Patient was also advised to come to the emergency room if he develop any worsening of tachycardia, chest pain or dizziness for further evaluation.  -He was educated to practice good sleep hygiene practices. He was provided with a good sleep hygiene hand out.  -Caden Lundberg was advised to make earlier appointment with my clinic if he develops any worsening of sleep symptoms. He verbalizes understanding.  -Caden Lundberg was advised to not to drive any motor vehicles or operate heavy equipment until his sleep symptoms are under good control. Konrad Albert verbalizes understanding.   -He was advised to loose weight by controlling diet and doing exercise once cleared by his family physician.   - Konrad Albert was educated about my impression and plan. He verbalizes understanding      -I personally reviewed updated the Past medical hx, Past surgical hx,Social hx, Family hx, Medications, Allergies in the discrete data section of the patient chart along with labs, Pulmonary medicine,Sleep medicine related, Pathological, Microbiological and Radiological investigations.

## 2022-02-14 NOTE — PROGRESS NOTES
Chief Complaint: New sleep consult, no prior studies    Mallampati airway Class:3  Neck Circumference:.15.5 Inches    [place] sleepiness score 2/14/22:   Sleep apnea quality of life questionnaire:.

## 2022-02-14 NOTE — PATIENT INSTRUCTIONS
Recommendations/Plan:  -He was advised to submit 2 weeks of sleep dairy for review of his sleep schedule.  -Schedule patient for nocturnal polysomnogram (Sleep study) followed by Multiple sleep latency test at [facility] sleep lab to further evaluate for Narcolepsy.  -I had a discussion with patient regarding avialable treatment options for his sleep disorder breathing including but not limited to CPAP titration in the sleep lab Vs.Dental appliance placement with referral to a local dentist Vs other available surgical options including Uvulopalatopharyngoplasty, maxillomandibular ostomy and tracheostomy as last option. At the end of discussion, he is not decided on his treatment if he found to have obstructive sleep apnea at this time.  -We will see Edsanto Osman back in 1week after the sleep study to go over the sleep study results and further management options.  -He was offered a cardiology referral for further evaluation of his tachycardia. However, he is going to work on his caffeine intake. He is going to discuss with his family physician Dr. Flor Flores MD regarding his tachycardia. He had an appointment with Dr. Flor Flores MD in the next 2 days. Patient was also advised to come to the emergency room if he develop any worsening of tachycardia, chest pain or dizziness for further evaluation.  -He was educated to practice good sleep hygiene practices. He was provided with a good sleep hygiene hand out.  -Elva Sen was advised to make earlier appointment with my clinic if he develops any worsening of sleep symptoms. He verbalizes understanding.  -Elva Sen was advised to not to drive any motor vehicles or operate heavy equipment until his sleep symptoms are under good control. Ed Osman verbalizes understanding.   -He was advised to loose weight by controlling diet and doing exercise once cleared by his family physician.   - Ed Osman was educated about my impression and plan.  He verbalizes understanding

## 2022-02-16 ENCOUNTER — OFFICE VISIT (OUTPATIENT)
Dept: FAMILY MEDICINE CLINIC | Age: 32
End: 2022-02-16
Payer: COMMERCIAL

## 2022-02-16 VITALS
RESPIRATION RATE: 16 BRPM | SYSTOLIC BLOOD PRESSURE: 122 MMHG | WEIGHT: 240.4 LBS | DIASTOLIC BLOOD PRESSURE: 76 MMHG | OXYGEN SATURATION: 98 % | HEART RATE: 103 BPM | HEIGHT: 69 IN | TEMPERATURE: 96.7 F | BODY MASS INDEX: 35.6 KG/M2

## 2022-02-16 DIAGNOSIS — Z11.59 ENCOUNTER FOR HEPATITIS C SCREENING TEST FOR LOW RISK PATIENT: ICD-10-CM

## 2022-02-16 DIAGNOSIS — R40.0 DAYTIME SLEEPINESS: Primary | ICD-10-CM

## 2022-02-16 DIAGNOSIS — E66.9 OBESITY (BMI 30-39.9): ICD-10-CM

## 2022-02-16 DIAGNOSIS — R00.0 TACHYCARDIA: ICD-10-CM

## 2022-02-16 PROCEDURE — 99214 OFFICE O/P EST MOD 30 MIN: CPT | Performed by: STUDENT IN AN ORGANIZED HEALTH CARE EDUCATION/TRAINING PROGRAM

## 2022-02-16 PROCEDURE — 93000 ELECTROCARDIOGRAM COMPLETE: CPT | Performed by: STUDENT IN AN ORGANIZED HEALTH CARE EDUCATION/TRAINING PROGRAM

## 2022-02-16 RX ORDER — OMEPRAZOLE 20 MG/1
20 CAPSULE, DELAYED RELEASE ORAL DAILY
COMMUNITY
End: 2022-09-27

## 2022-02-16 ASSESSMENT — ENCOUNTER SYMPTOMS
WHEEZING: 0
SHORTNESS OF BREATH: 0

## 2022-02-16 NOTE — PATIENT INSTRUCTIONS
Thank you   1. Thank you for trusting us with your healthcare needs. You may receive a survey regarding today's visit. It would help us out if you would take a few moments to provide your feedback. We value your input. 2. Please bring in ALL medications BOTTLES, including inhalers, herbal supplements, over the counter, prescribed & non-prescribed medicine. The office would like actual medication bottles and a list.   3. Please note our OFFICE POLICIES:   a. Prior to getting your labs drawn, please check with your insurance company for benefits and eligibility of lab services. Often, insurance companies cover certain tests for preventative visits only. It is patient's responsibility to see what is covered. b. We are unable to change a diagnosis after the test has been performed. c. Lab orders will not be re-printed. Please hold onto your original lab orders and take them to your lab to be completed. d. If you no show your scheduled appointment three times, you will be dismissed from this practice. e. Hitesh Squire must be completed 24 hours prior to your schedule appointment. 4. If the list below has been completed, PLEASE FAX RECORDS TO OUR OFFICE @ [phone].  Once the records have been received we will update your records at our office:  Health Maintenance Due   Topic Date Due    Hepatitis C screen  Never done    Varicella vaccine (1 of 2 - 2-dose childhood series) Never done    COVID-19 Vaccine (1) Never done    DTaP/Tdap/Td vaccine (1 - Tdap) Never done    Flu vaccine (1) Never done

## 2022-02-16 NOTE — PROGRESS NOTES
S: 28 y.o. male with   Chief Complaint   Patient presents with    Follow-up     Fatigue        -Fatigue:  + STOP-BANG  Inc fatigue  Has seen sleep and is setup for a sleep clinic.       -Tachycardia:  Noted at his sleep med visit  HR ~120  No sxs  Denies CP/SOB  No palpitations      -Obesity:  Diet poor  Does not exercise  Plans to really start working on some lifestyle changes. BP Readings from Last 3 Encounters:   02/16/22 122/76   02/14/22 128/72   02/02/22 126/72     Wt Readings from Last 3 Encounters:   02/16/22 240 lb 6.4 oz (109 kg)   02/14/22 236 lb 6.4 oz (107.2 kg)   02/02/22 237 lb (107.5 kg)       O: VS:  height is 5' 8.5\" (1.74 m) and weight is 240 lb 6.4 oz (109 kg). His temporal temperature is 96.7 °F (35.9 °C). His blood pressure is 122/76 and his pulse is 103. His respiration is 16 and oxygen saturation is 98%. AAO/NAD, appropriate affect for mood  CV:  RRR, no murmur  Resp: CTAB      Lab Results   Component Value Date    WBC 9.9 10/18/2021    HGB 15.4 10/18/2021    HCT 43.9 10/18/2021    MCV 88.9 10/18/2021     10/18/2021     Lab Results   Component Value Date    TSH 2.060 10/18/2021       EKG: NSR, no ST-T changes. Diagnosis Orders   1. Daytime sleepiness     2. Tachycardia  Comprehensive Metabolic Panel    Magnesium    Phosphorus    TSH With Reflex Ft4    EKG 12 Lead    CBC with Auto Differential   3. Obesity (BMI 30-39.9)     4. Encounter for hepatitis C screening test for low risk patient  Hepatitis C Antibody     Plan  -Fatigue: likely has ROB. Await sleep study.   -Tachycardia: unclear etiology. Does drink a lot of caffeine and not eating well etc. Pt is an IM resident. Will get labs above, and have him work on some lifestyle changes. If labs ok and tachy persists, will pursue further w/u from there.   -Obesity: discussed wt loss strategies.        Health Maintenance Due   Topic Date Due    Hepatitis C screen  Never done    COVID-19 Vaccine (1) Never done   Yoselyn Fickle DTaP/Tdap/Td vaccine (1 - Tdap) Never done    Flu vaccine (1) Never done       Attending Physician Statement  I have discussed the case, including pertinent history and exam findings with the resident. I also have seen the patient and performed key portions of the examination. I agree with the documented assessment and plan as documented by the resident.   GC modifier added to this encounter      Nubia Medina DO 2/16/2022 2:29 PM

## 2022-02-16 NOTE — PROGRESS NOTES
Ed Osman is a 28 y.o. male who presents today for:  Chief Complaint   Patient presents with    Follow-up     Fatigue        HPI:   Ongoing fatigue and daytime sleepiness  Has sleep study scheduled march 30th  Stop bang of 7    + snoring at night, some gasping at night per girlfriend     Tachycardia recently  90 this am  120 2 days ago at pul office  Cutting out caffeine starting yesterday  No palpitation, no chest pain    Working on doing the treadmill every other day     Overweight   Discussed diet        Food Insecurity: No Food Insecurity    Worried About Running Out of Food in the Last Year: Never true    Hay of Food in the Last Year: Never true     Health Maintenance reviewed -   Health Maintenance   Topic Date Due    Hepatitis C screen  Never done    COVID-19 Vaccine (1) Never done    DTaP/Tdap/Td vaccine (1 - Tdap) Never done    Flu vaccine (1) Never done    Varicella vaccine (1 of 2 - 2-dose childhood series) 03/09/2022 (Originally 9/13/1991)    Depression Screen  03/22/2022    HIV screen  Completed    Hepatitis A vaccine  Aged Out    Hepatitis B vaccine  Aged Out    Hib vaccine  Aged Out    Meningococcal (ACWY) vaccine  Aged Out    Pneumococcal 0-64 years Vaccine  Aged Out     Current Outpatient Medications   Medication Sig Dispense Refill    omeprazole (PRILOSEC) 20 MG delayed release capsule Take 20 mg by mouth Daily      CREATINE PO Take by mouth Couple times per week      Multiple Vitamin (MULTI VITAMIN DAILY PO) Take by mouth daily Taking with B6-in vitamin      Cholecalciferol (VITAMIN D3) 50 MCG (2000 UT) CAPS Take by mouth daily        No current facility-administered medications for this visit. Social History     Tobacco Use    Smoking status: Never Smoker    Smokeless tobacco: Never Used   Substance Use Topics    Alcohol use: Yes     Comment: occasional      Subjective:   Review of Systems   Constitutional: Positive for fatigue.    Respiratory: Negative for

## 2022-02-17 ENCOUNTER — HOSPITAL ENCOUNTER (OUTPATIENT)
Age: 32
Discharge: HOME | End: 2022-02-17
Payer: COMMERCIAL

## 2022-02-17 DIAGNOSIS — R00.0 TACHYCARDIA: ICD-10-CM

## 2022-02-17 DIAGNOSIS — G47.10 HYPERSOMNIA: ICD-10-CM

## 2022-02-17 DIAGNOSIS — Z11.59 ENCOUNTER FOR HEPATITIS C SCREENING TEST FOR LOW RISK PATIENT: ICD-10-CM

## 2022-02-17 DIAGNOSIS — R06.83 SNORING: ICD-10-CM

## 2022-02-17 LAB
ALBUMIN SERPL-MCNC: 4.4 G/DL (ref 3.5–5.1)
ALP BLD-CCNC: 71 U/L (ref 38–126)
ALT SERPL-CCNC: 35 U/L (ref 11–66)
AMPHETAMINE+METHAMPHETAMINE URINE SCREEN: NEGATIVE
ANION GAP SERPL CALCULATED.3IONS-SCNC: 8 MEQ/L (ref 8–16)
AST SERPL-CCNC: 25 U/L (ref 5–40)
BARBITURATE QUANTITATIVE URINE: NEGATIVE
BASOPHILS # BLD: 0.3 %
BASOPHILS ABSOLUTE: 0 THOU/MM3 (ref 0–0.1)
BENZODIAZEPINE QUANTITATIVE URINE: NEGATIVE
BILIRUB SERPL-MCNC: 0.5 MG/DL (ref 0.3–1.2)
BUN BLDV-MCNC: 16 MG/DL (ref 7–22)
CALCIUM SERPL-MCNC: 9 MG/DL (ref 8.5–10.5)
CANNABINOID QUANTITATIVE URINE: NEGATIVE
CHLORIDE BLD-SCNC: 102 MEQ/L (ref 98–111)
CO2: 27 MEQ/L (ref 23–33)
COCAINE METABOLITE QUANTITATIVE URINE: NEGATIVE
CREAT SERPL-MCNC: 0.9 MG/DL (ref 0.4–1.2)
EOSINOPHIL # BLD: 1.9 %
EOSINOPHILS ABSOLUTE: 0.2 THOU/MM3 (ref 0–0.4)
ERYTHROCYTE [DISTWIDTH] IN BLOOD BY AUTOMATED COUNT: 12.2 % (ref 11.5–14.5)
ERYTHROCYTE [DISTWIDTH] IN BLOOD BY AUTOMATED COUNT: 39.8 FL (ref 35–45)
GFR SERPL CREATININE-BSD FRML MDRD: > 90 ML/MIN/1.73M2
GLUCOSE BLD-MCNC: 83 MG/DL (ref 70–108)
HCT VFR BLD CALC: 46.8 % (ref 42–52)
HEMOGLOBIN: 16 GM/DL (ref 14–18)
HEPATITIS C ANTIBODY: NEGATIVE
IMMATURE GRANS (ABS): 0.02 THOU/MM3 (ref 0–0.07)
IMMATURE GRANULOCYTES: 0.2 %
LYMPHOCYTES # BLD: 36.2 %
LYMPHOCYTES ABSOLUTE: 3.2 THOU/MM3 (ref 1–4.8)
MAGNESIUM: 2.1 MG/DL (ref 1.6–2.4)
MCH RBC QN AUTO: 30.5 PG (ref 26–33)
MCHC RBC AUTO-ENTMCNC: 34.2 GM/DL (ref 32.2–35.5)
MCV RBC AUTO: 89.3 FL (ref 80–94)
MONOCYTES # BLD: 7.5 %
MONOCYTES ABSOLUTE: 0.7 THOU/MM3 (ref 0.4–1.3)
NUCLEATED RED BLOOD CELLS: 0 /100 WBC
OPIATES, URINE: NEGATIVE
OXYCODONE: NEGATIVE
PHENCYCLIDINE QUANTITATIVE URINE: NEGATIVE
PHOSPHORUS: 3 MG/DL (ref 2.4–4.7)
PLATELET # BLD: 363 THOU/MM3 (ref 130–400)
PMV BLD AUTO: 9.3 FL (ref 9.4–12.4)
POTASSIUM SERPL-SCNC: 4.1 MEQ/L (ref 3.5–5.2)
RBC # BLD: 5.24 MILL/MM3 (ref 4.7–6.1)
SEG NEUTROPHILS: 53.9 %
SEGMENTED NEUTROPHILS ABSOLUTE COUNT: 4.7 THOU/MM3 (ref 1.8–7.7)
SODIUM BLD-SCNC: 137 MEQ/L (ref 135–145)
TOTAL PROTEIN: 7.4 G/DL (ref 6.1–8)
TSH SERPL DL<=0.05 MIU/L-ACNC: 1.94 UIU/ML (ref 0.4–4.2)
WBC # BLD: 8.8 THOU/MM3 (ref 4.8–10.8)

## 2022-02-17 PROCEDURE — 85025 COMPLETE CBC W/AUTO DIFF WBC: CPT

## 2022-02-17 PROCEDURE — 86803 HEPATITIS C AB TEST: CPT

## 2022-02-17 PROCEDURE — 80307 DRUG TEST PRSMV CHEM ANLYZR: CPT

## 2022-02-17 PROCEDURE — 84443 ASSAY THYROID STIM HORMONE: CPT

## 2022-02-17 PROCEDURE — 84100 ASSAY OF PHOSPHORUS: CPT

## 2022-02-17 PROCEDURE — 83735 ASSAY OF MAGNESIUM: CPT

## 2022-02-17 PROCEDURE — 80053 COMPREHEN METABOLIC PANEL: CPT

## 2022-02-18 ENCOUNTER — TELEPHONE (OUTPATIENT)
Dept: FAMILY MEDICINE CLINIC | Age: 32
End: 2022-02-18

## 2022-02-18 NOTE — TELEPHONE ENCOUNTER
----- Message from Jazmyn Carbajal MD sent at 2/17/2022  7:33 PM EST -----  All labs are normal which is great

## 2022-03-24 NOTE — PATIENT INSTRUCTIONS
Thank you   1. Thank you for trusting us with your healthcare needs. You may receive a survey regarding today's visit. It would help us out if you would take a few moments to provide your feedback. We value your input. 2. Please bring in ALL medications BOTTLES, including inhalers, herbal supplements, over the counter, prescribed & non-prescribed medicine. The office would like actual medication bottles and a list.   3. Please note our OFFICE POLICIES:   a. Prior to getting your labs drawn, please check with your insurance company for benefits and eligibility of lab services. Often, insurance companies cover certain tests for preventative visits only. It is patient's responsibility to see what is covered. b. We are unable to change a diagnosis after the test has been performed. c. Lab orders will not be re-printed. Please hold onto your original lab orders and take them to your lab to be completed. d. If you no show your scheduled appointment three times, you will be dismissed from this practice. e. Sally Serrano must be completed 24 hours prior to your schedule appointment. 4. If the list below has been completed, PLEASE FAX RECORDS TO OUR OFFICE @ [phone].  Once the records have been received we will update your records at our office:  Health Maintenance Due   Topic Date Due    Hepatitis C screen  Never done    Varicella vaccine (1 of 2 - 2-dose childhood series) Never done    HIV screen  Never done    COVID-19 Vaccine (1) Never done    DTaP/Tdap/Td vaccine (1 - Tdap) Never done    Flu vaccine (1) Never done [employer]

## 2022-03-28 ENCOUNTER — TELEPHONE (OUTPATIENT)
Dept: PULMONOLOGY | Age: 32
End: 2022-03-28

## 2022-03-28 DIAGNOSIS — R06.83 SNORING: ICD-10-CM

## 2022-03-28 DIAGNOSIS — G47.10 HYPERSOMNIA: Primary | ICD-10-CM

## 2022-03-29 NOTE — PROGRESS NOTES
I spoke with Dr. Alea Valentino MD resident and internal medicine. Due to his a high deductible of $3000 to go for in lab sleep study, he canceled in lab sleep study along with MSLT test as planned at the time of his visit to my clinic. I spoke with him today regarding having a portable sleep study to evaluate for sleep apnea as an etiology for hypersomnia. He agreed to go for portable sleep study after going through the pros and cons of portable sleep study testing VS in lab sleep study testing. Plan: We will schedule patient for portable/home sleep study at [address] [place] [address]. We will arrange for follow-up with my clinic 1 week after the sleep study to go over the home/portable sleep study report and further management.

## 2022-04-07 ENCOUNTER — TELEPHONE (OUTPATIENT)
Dept: SLEEP CENTER | Age: 32
End: 2022-04-07

## 2022-04-07 NOTE — TELEPHONE ENCOUNTER
Called and spoke to Omar Coats. He is choosing to hold off on scheduling HST at this time. Stated he will call us should he decide to move forward with the sleep study.       Evelin Car

## 2022-07-13 ENCOUNTER — HOSPITAL ENCOUNTER (EMERGENCY)
Age: 32
Discharge: HOME | End: 2022-07-13
Attending: NURSE PRACTITIONER
Payer: COMMERCIAL

## 2022-07-13 VITALS
BODY MASS INDEX: 35.61 KG/M2 | SYSTOLIC BLOOD PRESSURE: 133 MMHG | RESPIRATION RATE: 16 BRPM | OXYGEN SATURATION: 96 % | WEIGHT: 235 LBS | HEIGHT: 68 IN | DIASTOLIC BLOOD PRESSURE: 75 MMHG | TEMPERATURE: 98.3 F | HEART RATE: 92 BPM

## 2022-07-13 DIAGNOSIS — J02.9 ACUTE PHARYNGITIS, UNSPECIFIED ETIOLOGY: Primary | ICD-10-CM

## 2022-07-13 LAB
FLU A ANTIGEN: NEGATIVE
GROUP A STREP CULTURE, REFLEX: NEGATIVE
INFLUENZA B AG, EIA: NEGATIVE
REFLEX THROAT C + S: NORMAL
SARS-COV-2, NAA: NOT  DETECTED

## 2022-07-13 PROCEDURE — 87880 STREP A ASSAY W/OPTIC: CPT

## 2022-07-13 PROCEDURE — 99213 OFFICE O/P EST LOW 20 MIN: CPT

## 2022-07-13 PROCEDURE — 99213 OFFICE O/P EST LOW 20 MIN: CPT | Performed by: NURSE PRACTITIONER

## 2022-07-13 PROCEDURE — 87635 SARS-COV-2 COVID-19 AMP PRB: CPT

## 2022-07-13 PROCEDURE — 87804 INFLUENZA ASSAY W/OPTIC: CPT

## 2022-07-13 PROCEDURE — 87070 CULTURE OTHR SPECIMN AEROBIC: CPT

## 2022-07-13 RX ORDER — FLUOXETINE HYDROCHLORIDE 20 MG/1
20 CAPSULE ORAL DAILY
COMMUNITY
End: 2022-09-27

## 2022-07-13 ASSESSMENT — ENCOUNTER SYMPTOMS
NAUSEA: 0
SINUS PRESSURE: 0
EYE ITCHING: 0
SHORTNESS OF BREATH: 0
VOMITING: 0
EYE PAIN: 0
CHEST TIGHTNESS: 0
EYE DISCHARGE: 0
BACK PAIN: 0
DIARRHEA: 0
SORE THROAT: 1
EYE REDNESS: 0
CONSTIPATION: 0
TROUBLE SWALLOWING: 0
RHINORRHEA: 0
WHEEZING: 0
COUGH: 0
ABDOMINAL PAIN: 0

## 2022-07-13 ASSESSMENT — PAIN - FUNCTIONAL ASSESSMENT: PAIN_FUNCTIONAL_ASSESSMENT: NONE - DENIES PAIN

## 2022-07-13 NOTE — ED TRIAGE NOTES
Pt to [facility] ambulatory with a sore throat, headache, and fever. This started on Friday.

## 2022-07-13 NOTE — ED PROVIDER NOTES
[address] [facility] Encounter      CHIEFCOMPLAINT       Chief Complaint   Patient presents with    Fever    Pharyngitis    Headache       Nurses Notes reviewed and I agree except as noted in the HPI. HISTORY OF PRESENT ILLNESS   Yao Dahl is a 28 y.o. male who presents dilation of a fever, sore throat, headache onset of symptoms x1 day. Patient notes that he recently had COVID a month ago. Denies cough, shortness of breath, chest pain, nausea, vomiting, diarrhea. He is taking over-the-counter aspirin and Tylenol which has helped with his fever. Fever is subjective. REVIEW OF SYSTEMS     Review of Systems   Constitutional: Positive for chills and fever. Negative for activity change, appetite change and fatigue. HENT: Positive for postnasal drip and sore throat. Negative for congestion, ear discharge, ear pain, hearing loss, rhinorrhea, sinus pressure and trouble swallowing. Eyes: Negative for pain, discharge, redness and itching. Respiratory: Negative for cough, chest tightness, shortness of breath and wheezing. Cardiovascular: Negative for chest pain, palpitations and leg swelling. Gastrointestinal: Negative for abdominal pain, constipation, diarrhea, nausea and vomiting. Endocrine: Negative. Genitourinary: Negative for dysuria, flank pain, frequency and hematuria. Musculoskeletal: Negative for arthralgias, back pain, joint swelling and myalgias. Skin: Negative. Neurological: Positive for headaches. Negative for dizziness and light-headedness. Hematological: Negative. PAST MEDICAL HISTORY         Diagnosis Date    Anxiety     Candida albicans infection     uses OTC medications    Carpal tunnel syndrome     sees doctor at [facility]; left wrist; Dr. Jacquelyn Chandler Depression     History of chicken pox        SURGICAL HISTORY     Patient  has no past surgical history on file.     CURRENT MEDICATIONS       Previous Medications    CHOLECALCIFEROL (VITAMIN D3) 50 MCG (2000 UT) CAPS    Take by mouth daily     CREATINE PO    Take by mouth Couple times per week    FLUOXETINE (PROZAC) 20 MG CAPSULE    Take 20 mg by mouth daily    MULTIPLE VITAMIN (MULTI VITAMIN DAILY PO)    Take by mouth daily Taking with B6-in vitamin    OMEPRAZOLE (PRILOSEC) 20 MG DELAYED RELEASE CAPSULE    Take 20 mg by mouth Daily       ALLERGIES     Patient is has No Known Allergies. FAMILY HISTORY     Patient'sfamily history includes Cancer in his paternal grandfather; No Known Problems in his father. SOCIAL HISTORY     Patient  reports that he has never smoked. He has never used smokeless tobacco. He reports current alcohol use. He reports that he does not use drugs. PHYSICAL EXAM     ED TRIAGE VITALS  BP: 133/75, Temp: 98.3 °F (36.8 °C), Heart Rate: 92, Resp: 16, SpO2: 96 %  Physical Exam  Vitals and nursing note reviewed. Constitutional:       General: He is not in acute distress. Appearance: He is well-developed. HENT:      Head: Normocephalic and atraumatic. Right Ear: Hearing, tympanic membrane and external ear normal.      Left Ear: Hearing, tympanic membrane and external ear normal.      Nose: Nose normal.      Mouth/Throat:      Mouth: Mucous membranes are moist.      Dentition: Normal dentition. Tongue: No lesions. Pharynx: Oropharyngeal exudate and posterior oropharyngeal erythema present. Eyes:      Conjunctiva/sclera: Conjunctivae normal.      Pupils: Pupils are equal, round, and reactive to light. Neck:      Thyroid: No thyromegaly. Vascular: No JVD. Trachea: No tracheal deviation. Cardiovascular:      Rate and Rhythm: Normal rate and regular rhythm. Heart sounds: Normal heart sounds. No murmur heard. No friction rub. No gallop. Pulmonary:      Effort: Pulmonary effort is normal. No respiratory distress. Breath sounds: Normal breath sounds. No stridor. No wheezing or rales. Chest:      Chest wall: No tenderness. Musculoskeletal:      Cervical back: Normal range of motion and neck supple. Lymphadenopathy:      Cervical: No cervical adenopathy. Skin:     General: Skin is warm and dry. Neurological:      Mental Status: He is alert and oriented to person, place, and time. Psychiatric:         Behavior: Behavior normal.         Judgment: Judgment normal.         DIAGNOSTIC RESULTS   Labs:   Results for orders placed or performed during the hospital encounter of 07/13/22   Strep A culture, throat    Specimen: Throat   Result Value Ref Range    REFLEX THROAT C + S INDICATED    COVID-19, Rapid   Result Value Ref Range    SARS-CoV-2, DANI NOT  DETECTED NOT DETECTED   STREP A ANTIGEN   Result Value Ref Range    GROUP A STREP CULTURE, REFLEX Negative        IMAGING:  No orders to display     URGENT CARE COURSE:     Vitals:    07/13/22 0812   BP: 133/75   Pulse: 92   Resp: 16   Temp: 98.3 °F (36.8 °C)   TempSrc: Temporal   SpO2: 96%   Weight: 235 lb (106.6 kg)   Height: 5' 8\" (1.727 m)       Medications - No data to display  PROCEDURES:  None  FINALIMPRESSION      1. Acute pharyngitis, unspecified etiology        DISPOSITION/PLAN   DISPOSITION    Viral pharyngitis. Rest  Tylenol   Motrin  Liquids   Salt water gargles.    PATIENT REFERRED TO:  Pina Moctezuma DO  [address] [place] [address] Way [address]  [phone]    In 2 days  If symptoms worsen    DISCHARGE MEDICATIONS:  New Prescriptions    No medications on file     Current Discharge Medication List          [address] [place] [address], [facility]  07/13/22 5991

## 2022-07-13 NOTE — Clinical Note
Reji Melendrez was seen and treated in our emergency department on 7/13/2022. He may return to work on 07/14/2022. If you have any questions or concerns, please don't hesitate to call.       Roger Srinivasan, VITOR - CNP

## 2022-07-15 LAB — THROAT/NOSE CULTURE: NORMAL

## 2022-09-27 ENCOUNTER — OFFICE VISIT (OUTPATIENT)
Dept: FAMILY MEDICINE CLINIC | Age: 32
End: 2022-09-27
Payer: COMMERCIAL

## 2022-09-27 VITALS
DIASTOLIC BLOOD PRESSURE: 80 MMHG | HEART RATE: 84 BPM | OXYGEN SATURATION: 98 % | TEMPERATURE: 98.4 F | HEIGHT: 68 IN | BODY MASS INDEX: 36.31 KG/M2 | SYSTOLIC BLOOD PRESSURE: 104 MMHG | RESPIRATION RATE: 14 BRPM | WEIGHT: 239.6 LBS

## 2022-09-27 DIAGNOSIS — F42.9 OBSESSIVE-COMPULSIVE DISORDER, UNSPECIFIED TYPE: ICD-10-CM

## 2022-09-27 DIAGNOSIS — E66.09 CLASS 2 OBESITY DUE TO EXCESS CALORIES WITHOUT SERIOUS COMORBIDITY WITH BODY MASS INDEX (BMI) OF 36.0 TO 36.9 IN ADULT: Primary | ICD-10-CM

## 2022-09-27 PROCEDURE — 99214 OFFICE O/P EST MOD 30 MIN: CPT | Performed by: NURSE PRACTITIONER

## 2022-09-27 ASSESSMENT — PATIENT HEALTH QUESTIONNAIRE - PHQ9
1. LITTLE INTEREST OR PLEASURE IN DOING THINGS: 0
SUM OF ALL RESPONSES TO PHQ QUESTIONS 1-9: 1
SUM OF ALL RESPONSES TO PHQ9 QUESTIONS 1 & 2: 1
SUM OF ALL RESPONSES TO PHQ QUESTIONS 1-9: 1
2. FEELING DOWN, DEPRESSED OR HOPELESS: 1

## 2022-09-27 NOTE — PROGRESS NOTES
[facility] at / [address] [facility] [address]  Chief Complaint   Patient presents with    Weight Management       History obtained from chart review and the patient. SUBJECTIVE:  Ramiro Hoffmann is a 33 y.o. male that presents today for establishing care with new physician, etc. New patient, 1[facility] time visit to [facility] @ [address]. Weight gain    Patient is reporting a weight gain of 70 lbs over the last 7 years. Food intake is heavy, carb addict. Activity level is light to none    Medication thought to cause weight changes? No   Hx of thyroid problems? No   Recent steroid use? No   Chest Pain or Shortness of breath? No  Lower extremity swelling? No     Wt Readings from Last 3 Encounters:   09/27/22 239 lb 9.6 oz (108.7 kg)   07/13/22 235 lb (106.6 kg)   02/16/22 240 lb 6.4 oz (109 kg)     Has quit caffeine which has helped his BP. Has stopped Prozac which he was taking for anxiety/depression. Admits to probably some OCD tendencies, currently feels like this is controlled.     Age/Gender Health Maintenance    Lipid -   Lab Results   Component Value Date    CHOL 152 07/16/2020     Lab Results   Component Value Date    TRIG 73 07/16/2020     Lab Results   Component Value Date    HDL 51 07/16/2020     Lab Results   Component Value Date    LDLCALC 86 07/16/2020     DM Screen - No results found for: LABA1C  No results found for: EAG  Colon Cancer Screening - 39  Lung Cancer Screening (Age 54 to [de-identified] with 30 pack year hx, current smoker or quit within past 15 years) - n/a    Tetanus - needs  Influenza Vaccine - yearly  Pneumonia Vaccine - 65  Zostavax - 50   HPV Vaccine - n/a    PSA testing discussion - 55  AAA Screening - n/a    Falls screening - n/a    Current Outpatient Medications   Medication Sig Dispense Refill    Multiple Vitamin (MULTI VITAMIN DAILY PO) Take by mouth daily Taking with B6-in vitamin      Cholecalciferol (VITAMIN D3) 50 MCG (2000 UT) CAPS Take by mouth daily        No current facility-administered medications for this visit. No orders of the defined types were placed in this encounter. All medications reviewed and reconciled, including OTC and herbal medications. Updated list given to patient. Patient Active Problem List   Diagnosis    Obsessive-compulsive disorder         Past Medical History:   Diagnosis Date    Anxiety     Candida albicans infection     uses OTC medications    Carpal tunnel syndrome     sees doctor at [facility]; left wrist; Dr. May Petersen    Depression     History of chicken pox        History reviewed. No pertinent surgical history.     No Known Allergies    Social History     Socioeconomic History    Marital status: Single     Spouse name: Not on file    Number of children: 0    Years of education: Not on file    Highest education level: Doctorate   Occupational History    Not on file   Tobacco Use    Smoking status: Never    Smokeless tobacco: Never   Vaping Use    Vaping Use: Never used   Substance and Sexual Activity    Alcohol use: Yes     Comment: occasional    Drug use: Never    Sexual activity: Not on file   Other Topics Concern    Not on file   Social History Narrative    Not on file     Social Determinants of Health     Financial Resource Strain: Low Risk     Difficulty of Paying Living Expenses: Not hard at all   Food Insecurity: No Food Insecurity    Worried About Running Out of Food in the Last Year: Never true    Ran Out of Food in the Last Year: Never true   Transportation Needs: Not on file   Physical Activity: Not on file   Stress: Not on file   Social Connections: Not on file   Intimate Partner Violence: Not on file   Housing Stability: Not on file       Family History   Problem Relation Age of Onset    No Known Problems Father     Cancer Paternal Grandfather         melanoma         I have reviewed the patient's past medical history, past surgical history, allergies, medications, social and family history and I have made updates where appropriate. Review of Systems  Positive responses are highlighted in bold    Constitutional:  Fever, Chills, Night Sweats, Fatigue, Unexpected changes in weight  Eyes:  Eye discharge, Eye pain, Eye redness, Visual disturbances   HENT:  Ear pain, Tinnitus, Nosebleeds, Trouble swallowing, Hearing loss, Sore throat  Cardiovascular:  Chest Pain, Palpitations, Orthopnea, Paroxysmal Nocturnal Dyspnea  Respiratory:  Cough, Wheezing, Shortness of breath, Chest tightness, Apnea  Gastrointestinal:  Nausea, Vomiting, Diarrhea, Constipation, Heartburn, Blood in stool  Genitourinary:  Difficulty or painful urination, Flank pain, Change in frequency, Urgency  Skin:  Color change, Rash, Itching, Wound  Psychiatric:  Hallucinations, Anxiety, Depression, Suicidal ideation  Hematological:  Enlarged glands, Easy bleeding, Easily bruising  Musculoskeletal:  Joint pain, Back pain, Gait problems, Joint swelling, Myalgias  Neurological:  Dizziness, Headaches, Presyncope, Numbness, Seizures, Tremors  Allergy:  Environmental allergies, Food allergies  Endocrine:  Heat Intolerance, Cold Intolerance, Polydipsia, Polyphagia, Polyuria      PHYSICAL EXAM:  Vitals:    09/27/22 1109   BP: 104/80   Pulse: 84   Resp: 14   Temp: 98.4 °F (36.9 °C)   TempSrc: Oral   SpO2: 98%   Weight: 239 lb 9.6 oz (108.7 kg)   Height: 5' 8\" (1.727 m)     Body mass index is 36.43 kg/m².          VS Reviewed  General Appearance: A&O x 3, No acute distress,well developed and well- nourished  Head: normocephalic and atraumatic  Eyes: pupils equal, round, and reactive to light, extraocular eye movements intact, conjunctivae and eye lids without erythema  ENT: external ear and ear canal clear bilaterally, TMs intact and regular, nose without deformity, nasal mucosa and turbinates normal without polyps, oropharynx normal, dentition is normal for age  Neck: supple and non-tender without mass, no thyromegaly or thyroid nodules, no cervical lymphadenopathy  Pulmonary/Chest: clear to auscultation bilaterally- no wheezes, rales or rhonchi, normal air movement, no respiratory distress or retractions  Cardiovascular: S1 and S2 auscultated w/ RRR. No murmurs, rubs, clicks, or gallops, distal pulses intact. Abdomen: soft, non-tender, non-distended, bowl sounds physiologic,  no rebound or guarding, no masses or hernias noted. Liver and spleen without enlargement. Extremities: no cyanosis, clubbing or edema of the lower extremities  Musculoskeletal: No joint swelling or gross deformity   Neuro:  Alert, 5/5 strength globally and symmetrically  Psych: Affect appropriate. Mood normal. Thought process is normal without evidence of depression or psychosis. Good insight and appropriate interaction. Cognition and memory appear to be intact. Skin: warm and dry, no rash or erythema  Lymph:  No cervical, auricular or supraclavicular lymph nodes palpated    ASSESSMENT & PLAN  Rose Hawley was seen today for weight management. Diagnoses and all orders for this visit:    Class 2 obesity due to excess calories without serious comorbidity with body mass index (BMI) of 36.0 to 36.9 in adult  -     Lipid, Fasting; Future  -     Hemoglobin A1C; Future  -     C-Peptide, Serum; Future  -     Insulin, Fasting; Future    Obsessive-compulsive disorder, unspecified type      - obtain labs  - will likely start Mounjaro once labs back  - discussed diet/exercise and lifestyle modifications to help with weight loss  - anxiety/depression issues currently stable, off Prozac, will monitor for now    DISPOSITION    Return in about 6 weeks (around 11/8/2022) for obesity. Rose Hawley released without restrictions. PATIENT COUNSELING    Counseling was provided today regarding the following topics: Healthy eating habits, Regular exercise, substance abuse and healthy sleep habits.     Rose Hawley received counseling on the following healthy behaviors: nutrition and exercise    Patient given educational materials on: See Attached    I have instructed Yen Burrell to complete a self tracking handout on none and instructed them to bring it with them to his next appointment. Barriers to learning and self management: none    Discussed use, benefit, and side effects of prescribed medications. Barriers to medication compliance addressed. All patient questions answered. Pt voiced understanding.        Electronically signed by VITOR Hunter CNP on 9/27/2022 at 11:30 AM

## 2022-10-03 ENCOUNTER — NURSE ONLY (OUTPATIENT)
Dept: LAB | Age: 32
End: 2022-10-03

## 2022-10-03 ENCOUNTER — TELEPHONE (OUTPATIENT)
Dept: FAMILY MEDICINE CLINIC | Age: 32
End: 2022-10-03

## 2022-10-03 ENCOUNTER — HOSPITAL ENCOUNTER (OUTPATIENT)
Age: 32
Discharge: HOME | End: 2022-10-03
Payer: COMMERCIAL

## 2022-10-03 DIAGNOSIS — E88.81 INSULIN RESISTANCE: Primary | ICD-10-CM

## 2022-10-03 DIAGNOSIS — E66.09 CLASS 2 OBESITY DUE TO EXCESS CALORIES WITHOUT SERIOUS COMORBIDITY WITH BODY MASS INDEX (BMI) OF 36.0 TO 36.9 IN ADULT: ICD-10-CM

## 2022-10-03 LAB
AVERAGE GLUCOSE: 99 MG/DL (ref 70–126)
CHOLESTEROL, FASTING: 188 MG/DL (ref 100–199)
HBA1C MFR BLD: 5.3 % (ref 4.4–6.4)
HDLC SERPL-MCNC: 47 MG/DL
INSULIN, RANDOM OR FASTING: 17.2 MU/L
LDL CHOLESTEROL CALCULATED: 125 MG/DL
TRIGLYCERIDE, FASTING: 78 MG/DL (ref 0–199)

## 2022-10-03 PROCEDURE — 83525 ASSAY OF INSULIN: CPT

## 2022-10-03 PROCEDURE — 36415 COLL VENOUS BLD VENIPUNCTURE: CPT

## 2022-10-03 PROCEDURE — 83036 HEMOGLOBIN GLYCOSYLATED A1C: CPT

## 2022-10-03 PROCEDURE — 80061 LIPID PANEL: CPT

## 2022-10-03 NOTE — TELEPHONE ENCOUNTER
----- Message from VITOR Barksdale - CNP sent at 10/3/2022 12:35 PM EDT -----  Let Patt Lam know his labs so far look ok, cholesterol a little higher than previous, but still not bad. Just rec'd work on diet/exercise. 2 labs are still pending, I am going to go ahead and send over the Rx for the [facility].

## 2022-10-04 ENCOUNTER — TELEPHONE (OUTPATIENT)
Dept: FAMILY MEDICINE CLINIC | Age: 32
End: 2022-10-04

## 2022-10-04 NOTE — TELEPHONE ENCOUNTER
----- Message from VITOR Florentino CNP sent at 10/4/2022  8:58 AM EDT -----  Let Jayme Wells know his fasting insulin level is normal. C peptide pending.

## 2022-10-07 ENCOUNTER — TELEPHONE (OUTPATIENT)
Dept: FAMILY MEDICINE CLINIC | Age: 32
End: 2022-10-07

## 2022-10-07 LAB — C-PEPTIDE: 2.1 NG/ML (ref 0.5–3.3)

## 2022-10-07 NOTE — TELEPHONE ENCOUNTER
----- Message from VITOR Morrow CNP sent at 10/7/2022  9:30 AM EDT -----  Let Dr Benson Going know his C peptide level looks good, in normal range.

## 2022-11-08 ENCOUNTER — OFFICE VISIT (OUTPATIENT)
Dept: FAMILY MEDICINE CLINIC | Age: 32
End: 2022-11-08
Payer: COMMERCIAL

## 2022-11-08 VITALS
WEIGHT: 229.6 LBS | TEMPERATURE: 98.5 F | RESPIRATION RATE: 14 BRPM | SYSTOLIC BLOOD PRESSURE: 118 MMHG | OXYGEN SATURATION: 97 % | BODY MASS INDEX: 34.8 KG/M2 | HEART RATE: 88 BPM | DIASTOLIC BLOOD PRESSURE: 79 MMHG | HEIGHT: 68 IN

## 2022-11-08 DIAGNOSIS — E66.09 CLASS 1 OBESITY DUE TO EXCESS CALORIES WITHOUT SERIOUS COMORBIDITY WITH BODY MASS INDEX (BMI) OF 34.0 TO 34.9 IN ADULT: Primary | ICD-10-CM

## 2022-11-08 PROCEDURE — 99213 OFFICE O/P EST LOW 20 MIN: CPT | Performed by: NURSE PRACTITIONER

## 2022-11-08 NOTE — PROGRESS NOTES
[facility] Medicine at [address]/ [address] [facility] [address]  Chief Complaint   Patient presents with    Follow-up     Weight management        History obtained from chart review and the patient. SUBJECTIVE:  Monserrat Rocha is a 33 y.o. male that presents today for follow up obesity    Obesity  Taking Mounjaro and tolerating it ok. Had some initial nausea and GERD, and he does note that if he overeats, he will have some nausea and heartburn. He hasn't been eating as much as result. The Mounjaro does eliminate cravings as well. He is not exercising.  He has only had 2 doses of the medication because of cost.    Wt Readings from Last 3 Encounters:   11/08/22 229 lb 9.6 oz (104.1 kg)   09/27/22 239 lb 9.6 oz (108.7 kg)   07/13/22 235 lb (106.6 kg)       Age/Gender Health Maintenance    Lipid -   Lab Results   Component Value Date    CHOL 152 07/16/2020     Lab Results   Component Value Date    TRIG 73 07/16/2020     Lab Results   Component Value Date    HDL 47 10/03/2022    HDL 51 07/16/2020     Lab Results   Component Value Date    LDLCALC 125 10/03/2022    [address] 07/16/2020     DM Screen -   Lab Results   Component Value Date    LABA1C 5.3 10/03/2022     No results found for: EAG  Colon Cancer Screening - 39  Lung Cancer Screening (Age 54 to [de-identified] with 30 pack year hx, current smoker or quit within past 15 years) - n/a    Tetanus - needs  Influenza Vaccine - yearly  Pneumonia Vaccine - 65  Zostavax - 50   HPV Vaccine - n/a    PSA testing discussion - 55  AAA Screening - n/a    Falls screening - n/a    Current Outpatient Medications   Medication Sig Dispense Refill    Multiple Vitamin (MULTI VITAMIN DAILY PO) Take by mouth daily Taking with B6-in vitamin      Cholecalciferol (VITAMIN D3) 50 MCG (2000 UT) CAPS Take by mouth daily       Tirzepatide (MOUNJARO) 2.5 MG/0.5ML SOPN SC injection Inject 0.5 mLs into the skin once a week for 4 doses 2 mL 0     No current facility-administered medications for this visit. No orders of the defined types were placed in this encounter. All medications reviewed and reconciled, including OTC and herbal medications. Updated list given to patient. Patient Active Problem List   Diagnosis    Obsessive-compulsive disorder    Class 1 obesity due to excess calories without serious comorbidity with body mass index (BMI) of 34.0 to 34.9 in adult         Past Medical History:   Diagnosis Date    Anxiety     Candida albicans infection     uses OTC medications    Carpal tunnel syndrome     sees doctor at [facility]; left wrist; Dr. Eufemia Bowling    Depression     History of chicken pox        No past surgical history on file. No Known Allergies    Social History     Socioeconomic History    Marital status: Single     Spouse name: Not on file    Number of children: 0    Years of education: Not on file    Highest education level: Doctorate   Occupational History    Not on file   Tobacco Use    Smoking status: Never    Smokeless tobacco: Never   Vaping Use    Vaping Use: Never used   Substance and Sexual Activity    Alcohol use: Yes     Comment: occasional    Drug use: Never    Sexual activity: Not on file   Other Topics Concern    Not on file   Social History Narrative    Not on file     Social Determinants of Health     Financial Resource Strain: Not on file   Food Insecurity: Not on file   Transportation Needs: Not on file   Physical Activity: Not on file   Stress: Not on file   Social Connections: Not on file   Intimate Partner Violence: Not on file   Housing Stability: Not on file       Family History   Problem Relation Age of Onset    No Known Problems Father     Cancer Paternal Grandfather         melanoma         I have reviewed the patient's past medical history, past surgical history, allergies, medications, social and family history and I have made updates where appropriate.       Review of Systems  Positive responses are highlighted in bold    Constitutional:  Fever, Chills, Night Sweats, Fatigue, Unexpected changes in weight  Eyes:  Eye discharge, Eye pain, Eye redness, Visual disturbances   HENT:  Ear pain, Tinnitus, Nosebleeds, Trouble swallowing, Hearing loss, Sore throat  Cardiovascular:  Chest Pain, Palpitations, Orthopnea, Paroxysmal Nocturnal Dyspnea  Respiratory:  Cough, Wheezing, Shortness of breath, Chest tightness, Apnea  Gastrointestinal:  Nausea, Vomiting, Diarrhea, Constipation, Heartburn, Blood in stool  Genitourinary:  Difficulty or painful urination, Flank pain, Change in frequency, Urgency  Skin:  Color change, Rash, Itching, Wound  Psychiatric:  Hallucinations, Anxiety, Depression, Suicidal ideation  Hematological:  Enlarged glands, Easy bleeding, Easily bruising  Musculoskeletal:  Joint pain, Back pain, Gait problems, Joint swelling, Myalgias  Neurological:  Dizziness, Headaches, Presyncope, Numbness, Seizures, Tremors  Allergy:  Environmental allergies, Food allergies  Endocrine:  Heat Intolerance, Cold Intolerance, Polydipsia, Polyphagia, Polyuria    Lab Results   Component Value Date     02/17/2022    K 4.1 02/17/2022     02/17/2022    CO2 27 02/17/2022    BUN 16 02/17/2022    CREATININE 0.9 02/17/2022    GLUCOSE 83 02/17/2022    CALCIUM 9.0 02/17/2022    PROT 7.4 02/17/2022    LABALBU 4.4 02/17/2022    BILITOT 0.5 02/17/2022    ALKPHOS 71 02/17/2022    AST 25 02/17/2022    ALT 35 02/17/2022    LABGLOM >90 02/17/2022     Lab Results   Component Value Date    WBC 8.8 02/17/2022    HGB 16.0 02/17/2022    HCT 46.8 02/17/2022    MCV 89.3 02/17/2022     02/17/2022     Lab Results   Component Value Date    TSH 1.940 02/17/2022         PHYSICAL EXAM:  Vitals:    11/08/22 0834   BP: 118/79   Pulse: 88   Resp: 14   Temp: 98.5 °F (36.9 °C)   TempSrc: Oral   SpO2: 97%   Weight: 229 lb 9.6 oz (104.1 kg)   Height: 5' 8\" (1.727 m)       Body mass index is 34.91 kg/m².          VS Reviewed  General Appearance: A&O x 3, No acute distress,well developed and well- nourished  Head: normocephalic and atraumatic  Eyes: pupils equal, round, and reactive to light, extraocular eye movements intact, conjunctivae and eye lids without erythema  Neck: supple and non-tender without mass, no thyromegaly or thyroid nodules, no cervical lymphadenopathy  Pulmonary/Chest: clear to auscultation bilaterally- no wheezes, rales or rhonchi, normal air movement, no respiratory distress or retractions  Cardiovascular: S1 and S2 auscultated w/ RRR. No murmurs, rubs, clicks, or gallops, distal pulses intact. Abdomen: soft, non-tender, non-distended, bowl sounds physiologic,  no rebound or guarding, no masses or hernias noted. Liver and spleen without enlargement. Extremities: no cyanosis, clubbing or edema of the lower extremities  Musculoskeletal: No joint swelling or gross deformity   Neuro:  Alert, 5/5 strength globally and symmetrically  Psych: Affect appropriate. Mood normal. Thought process is normal without evidence of depression or psychosis. Good insight and appropriate interaction. Cognition and memory appear to be intact. Skin: warm and dry, no rash or erythema  Lymph:  No cervical, auricular or supraclavicular lymph nodes palpated    ASSESSMENT & PLAN  Olivia Coello was seen today for follow-up. Diagnoses and all orders for this visit:    Class 1 obesity due to excess calories without serious comorbidity with body mass index (BMI) of 34.0 to 34.9 in adult    - con't with [facility] [place] [place], patient will call when he is ready for the 5 mg dose  - work on healthy diet/exercise    DISPOSITION    Return in about 4 months (around 3/8/2023) for obesity. Olivia Mayat released without restrictions. PATIENT COUNSELING    Counseling was provided today regarding the following topics: Healthy eating habits, Regular exercise, substance abuse and healthy sleep habits.     Olivia Coello received counseling on the following healthy behaviors: nutrition and exercise    Patient given educational materials on: See Attached    I have instructed Phil Solomon to complete a self tracking handout on none and instructed them to bring it with them to his next appointment. Barriers to learning and self management: none    Discussed use, benefit, and side effects of prescribed medications. Barriers to medication compliance addressed. All patient questions answered. Pt voiced understanding.        Electronically signed by VITOR Kate CNP on 11/8/2022 at 9:06 AM

## 2022-12-01 DIAGNOSIS — E88.81 INSULIN RESISTANCE: Primary | ICD-10-CM

## 2023-03-05 SDOH — ECONOMIC STABILITY: FOOD INSECURITY: WITHIN THE PAST 12 MONTHS, YOU WORRIED THAT YOUR FOOD WOULD RUN OUT BEFORE YOU GOT MONEY TO BUY MORE.: NEVER TRUE

## 2023-03-05 SDOH — ECONOMIC STABILITY: INCOME INSECURITY: HOW HARD IS IT FOR YOU TO PAY FOR THE VERY BASICS LIKE FOOD, HOUSING, MEDICAL CARE, AND HEATING?: NOT HARD AT ALL

## 2023-03-05 SDOH — ECONOMIC STABILITY: HOUSING INSECURITY
IN THE LAST 12 MONTHS, WAS THERE A TIME WHEN YOU DID NOT HAVE A STEADY PLACE TO SLEEP OR SLEPT IN A SHELTER (INCLUDING NOW)?: NO

## 2023-03-05 SDOH — ECONOMIC STABILITY: FOOD INSECURITY: WITHIN THE PAST 12 MONTHS, THE FOOD YOU BOUGHT JUST DIDN'T LAST AND YOU DIDN'T HAVE MONEY TO GET MORE.: NEVER TRUE

## 2023-03-06 ENCOUNTER — HOSPITAL ENCOUNTER (OUTPATIENT)
Age: 33
Discharge: HOME | End: 2023-03-06
Payer: COMMERCIAL

## 2023-03-06 ENCOUNTER — OFFICE VISIT (OUTPATIENT)
Dept: FAMILY MEDICINE CLINIC | Age: 33
End: 2023-03-06
Payer: COMMERCIAL

## 2023-03-06 VITALS
SYSTOLIC BLOOD PRESSURE: 114 MMHG | HEIGHT: 68 IN | DIASTOLIC BLOOD PRESSURE: 68 MMHG | TEMPERATURE: 98.4 F | BODY MASS INDEX: 34.4 KG/M2 | WEIGHT: 227 LBS | OXYGEN SATURATION: 97 % | RESPIRATION RATE: 14 BRPM | HEART RATE: 99 BPM

## 2023-03-06 DIAGNOSIS — B35.1 ONYCHOMYCOSIS: ICD-10-CM

## 2023-03-06 DIAGNOSIS — H61.23 BILATERAL IMPACTED CERUMEN: ICD-10-CM

## 2023-03-06 DIAGNOSIS — N52.9 ERECTILE DYSFUNCTION, UNSPECIFIED ERECTILE DYSFUNCTION TYPE: ICD-10-CM

## 2023-03-06 DIAGNOSIS — E66.09 CLASS 1 OBESITY DUE TO EXCESS CALORIES WITHOUT SERIOUS COMORBIDITY WITH BODY MASS INDEX (BMI) OF 34.0 TO 34.9 IN ADULT: Primary | ICD-10-CM

## 2023-03-06 PROCEDURE — 36415 COLL VENOUS BLD VENIPUNCTURE: CPT

## 2023-03-06 PROCEDURE — 84403 ASSAY OF TOTAL TESTOSTERONE: CPT

## 2023-03-06 PROCEDURE — 99214 OFFICE O/P EST MOD 30 MIN: CPT | Performed by: NURSE PRACTITIONER

## 2023-03-06 RX ORDER — TADALAFIL 10 MG/1
10 TABLET ORAL DAILY PRN
Qty: 10 TABLET | Refills: 5 | Status: SHIPPED | OUTPATIENT
Start: 2023-03-06

## 2023-03-06 RX ORDER — PHENTERMINE HYDROCHLORIDE 37.5 MG/1
37.5 TABLET ORAL
Qty: 30 TABLET | Refills: 0 | Status: SHIPPED | OUTPATIENT
Start: 2023-03-06 | End: 2023-04-05

## 2023-03-06 RX ORDER — TERBINAFINE HYDROCHLORIDE 250 MG/1
250 TABLET ORAL DAILY
Qty: 84 TABLET | Refills: 0 | Status: SHIPPED | OUTPATIENT
Start: 2023-03-06 | End: 2023-05-29

## 2023-03-06 ASSESSMENT — PATIENT HEALTH QUESTIONNAIRE - PHQ9
SUM OF ALL RESPONSES TO PHQ QUESTIONS 1-9: 1
2. FEELING DOWN, DEPRESSED OR HOPELESS: 1

## 2023-03-06 NOTE — PROGRESS NOTES
[facility] [address], [address]  Chief Complaint   Patient presents with    Follow-up     Weight loss     Ear Fullness     Both ears     Other     Pt thinks he has toenail fungus     Erectile Dysfunction       History obtained from chart review and the patient. SUBJECTIVE:  Jordan Osorio is a 33 y.o. male that presents today for follow up obesity    Obesity  Stopped Mounjaro because of supply issues. Hasn't taken it since December. HPI:    The patient presents to the office for a refill on phentermine (Adipex). This is month number  0  for this series. The patient has lost 2 pounds since the last visit. The patient has been prescribed phentermine as part of a weight loss regimen which also includes dietary restrictions and structured exercise program.  The patient reports compliance with the dietary restrictions is good. The patient reports an exercise regimen which includes walking    Patient reports no adverse side effects from current medication regimen. ROS:  Gen: no fevers, chills, sweats. Cardiac: No chest pain, palpitations, syncope, lightheadedness. Pulmonary: No wheezes or dyspnea. GI: No nausea, vomiting, diarrhea, abdominal discomfort. Neuro: No tremors, headaches. Psych: No agitation or insomnia. Wt Readings from Last 3 Encounters:   03/06/23 227 lb (103 kg)   11/08/22 229 lb 9.6 oz (104.1 kg)   09/27/22 239 lb 9.6 oz (108.7 kg)     Onychomycosis  C/o yellowed brittle toenails on both feet. Erectile Dysfunction  Symptoms have been present for 2 year(s). His symptoms did not start rapidly. Symptoms occur occasional.  He does not have a history of trauma to the genital region. He does have an issue with achieving erection  He does have problems with maintaining an erection  He rates his erectile rigidity on a scale of 1-10 as a 7 (5 being just firm enough for penetration).   He has spontaneous or nocturnal erections since symptoms started    He does not have problems with sexual desire  He does not have problems with low energy level  He does not have symptoms of depression or anxiety  He does not smoke   He does not drink alcohol regularly  He does not report difficulties with relationship with his partner(s)    He does not take oral nitrates for chest pain. He has tried nothing in the past with satisfactory results. No results found for: TESTOSTERONE    No results found for: PSA      Age/Gender Health Maintenance    Lipid -   Lab Results   Component Value Date    CHOL 152 07/16/2020     Lab Results   Component Value Date    TRIG 73 07/16/2020     Lab Results   Component Value Date    HDL 47 10/03/2022    HDL 51 07/16/2020     Lab Results   Component Value Date    LDLCALC 125 10/03/2022    LDLCALC 86 07/16/2020     DM Screen -   Lab Results   Component Value Date    LABA1C 5.3 10/03/2022     No results found for: EAG  Colon Cancer Screening - 39  Lung Cancer Screening (Age 54 to [de-identified] with 30 pack year hx, current smoker or quit within past 15 years) - n/a    Tetanus - needs  Influenza Vaccine - yearly  Pneumonia Vaccine - 65  Zostavax - 50   HPV Vaccine - n/a    PSA testing discussion - 55  AAA Screening - n/a    Falls screening - n/a    Current Outpatient Medications   Medication Sig Dispense Refill    phentermine (ADIPEX-P) 37.5 MG tablet Take 1 tablet by mouth every morning (before breakfast) for 30 days. Max Daily Amount: 37.5 mg 30 tablet 0    terbinafine (LAMISIL) 250 MG tablet Take 1 tablet by mouth daily 84 tablet 0    ciclopirox (PENLAC) 8 % solution Apply topically nightly. 6 mL 3    tadalafil (CIALIS) 10 MG tablet Take 1 tablet by mouth daily as needed for Erectile Dysfunction 10 tablet 5    Multiple Vitamin (MULTI VITAMIN DAILY PO) Take by mouth daily Taking with B6-in vitamin      Cholecalciferol (VITAMIN D3) 50 MCG (2000 UT) CAPS Take by mouth daily        No current facility-administered medications for this visit.     Orders Placed This Encounter   Medications    phentermine (ADIPEX-P) 37.5 MG tablet     Sig: Take 1 tablet by mouth every morning (before breakfast) for 30 days. Max Daily Amount: 37.5 mg     Dispense:  30 tablet     Refill:  0    terbinafine (LAMISIL) 250 MG tablet     Sig: Take 1 tablet by mouth daily     Dispense:  84 tablet     Refill:  0    ciclopirox (PENLAC) 8 % solution     Sig: Apply topically nightly.     Dispense:  6 mL     Refill:  3    tadalafil (CIALIS) 10 MG tablet     Sig: Take 1 tablet by mouth daily as needed for Erectile Dysfunction     Dispense:  10 tablet     Refill:  5           All medications reviewed and reconciled, including OTC and herbal medications. Updated list given to patient.       Patient Active Problem List   Diagnosis    Obsessive-compulsive disorder    Class 1 obesity due to excess calories without serious comorbidity with body mass index (BMI) of 34.0 to 34.9 in adult         Past Medical History:   Diagnosis Date    Anxiety     Candida albicans infection     uses OTC medications    Carpal tunnel syndrome     sees doctor at [facility]; left wrist; Dr. South    Depression     History of chicken pox        History reviewed. No pertinent surgical history.    No Known Allergies    Social History     Socioeconomic History    Marital status: Single     Spouse name: Not on file    Number of children: 0    Years of education: Not on file    Highest education level: Doctorate   Occupational History    Not on file   Tobacco Use    Smoking status: Never    Smokeless tobacco: Never   Vaping Use    Vaping Use: Never used   Substance and Sexual Activity    Alcohol use: Yes     Comment: occasional    Drug use: Never    Sexual activity: Not on file   Other Topics Concern    Not on file   Social History Narrative    Not on file     Social Determinants of Health     Financial Resource Strain: Not on file   Food Insecurity: Not on file   Transportation Needs: Not on file   Physical Activity: Not on  file   Stress: Not on file   Social Connections: Not on file   Intimate Partner Violence: Not on file   Housing Stability: Not on file       Family History   Problem Relation Age of Onset    No Known Problems Father     Cancer Paternal Grandfather         melanoma         I have reviewed the patient's past medical history, past surgical history, allergies, medications, social and family history and I have made updates where appropriate.       Review of Systems  Positive responses are highlighted in bold    Constitutional:  Fever, Chills, Night Sweats, Fatigue, Unexpected changes in weight  Eyes:  Eye discharge, Eye pain, Eye redness, Visual disturbances   HENT:  Ear pain, Tinnitus, Nosebleeds, Trouble swallowing, Hearing loss, Sore throat  Cardiovascular:  Chest Pain, Palpitations, Orthopnea, Paroxysmal Nocturnal Dyspnea  Respiratory:  Cough, Wheezing, Shortness of breath, Chest tightness, Apnea  Gastrointestinal:  Nausea, Vomiting, Diarrhea, Constipation, Heartburn, Blood in stool  Genitourinary:  Difficulty or painful urination, Flank pain, Change in frequency, Urgency  Skin:  Color change, Rash, Itching, Wound  Psychiatric:  Hallucinations, Anxiety, Depression, Suicidal ideation  Hematological:  Enlarged glands, Easy bleeding, Easily bruising  Musculoskeletal:  Joint pain, Back pain, Gait problems, Joint swelling, Myalgias  Neurological:  Dizziness, Headaches, Presyncope, Numbness, Seizures, Tremors  Allergy:  Environmental allergies, Food allergies  Endocrine:  Heat Intolerance, Cold Intolerance, Polydipsia, Polyphagia, Polyuria    Lab Results   Component Value Date     02/17/2022    K 4.1 02/17/2022     02/17/2022    CO2 27 02/17/2022    BUN 16 02/17/2022    CREATININE 0.9 02/17/2022    GLUCOSE 83 02/17/2022    CALCIUM 9.0 02/17/2022    PROT 7.4 02/17/2022    LABALBU 4.4 02/17/2022    BILITOT 0.5 02/17/2022    ALKPHOS 71 02/17/2022    AST 25 02/17/2022    ALT 35 02/17/2022    LABGLOM >90 02/17/2022 Lab Results   Component Value Date    WBC 8.8 02/17/2022    HGB 16.0 02/17/2022    HCT 46.8 02/17/2022    MCV 89.3 02/17/2022     02/17/2022     Lab Results   Component Value Date    TSH 1.940 02/17/2022         PHYSICAL EXAM:  Vitals:    03/06/23 0820   BP: 114/68   Pulse: 99   Resp: 14   Temp: 98.4 °F (36.9 °C)   TempSrc: Oral   SpO2: 97%   Weight: 227 lb (103 kg)   Height: 5' 8\" (1.727 m)       Body mass index is 34.52 kg/m². VS Reviewed  General Appearance: A&O x 3, No acute distress,well developed and well- nourished  Head: normocephalic and atraumatic  Eyes: pupils equal, round, and reactive to light, extraocular eye movements intact, conjunctivae and eye lids without erythema  ENT: bilat ear canal with excess cerumen  Neck: supple and non-tender without mass, no thyromegaly or thyroid nodules, no cervical lymphadenopathy  Pulmonary/Chest: clear to auscultation bilaterally- no wheezes, rales or rhonchi, normal air movement, no respiratory distress or retractions  Cardiovascular: S1 and S2 auscultated w/ RRR. No murmurs, rubs, clicks, or gallops, distal pulses intact. Abdomen: soft, non-tender, non-distended, bowl sounds physiologic,  no rebound or guarding, no masses or hernias noted. Liver and spleen without enlargement. Extremities: no cyanosis, clubbing or edema of the lower extremities  Musculoskeletal: No joint swelling or gross deformity   Neuro:  Alert, 5/5 strength globally and symmetrically  Psych: Affect appropriate. Mood normal. Thought process is normal without evidence of depression or psychosis. Good insight and appropriate interaction. Cognition and memory appear to be intact. Skin: warm and dry, no rash or erythema, thickened yellowed toenails  Lymph:  No cervical, auricular or supraclavicular lymph nodes palpated    ASSESSMENT & PLAN  Yen Burrell was seen today for follow-up, ear fullness, other and erectile dysfunction.     Diagnoses and all orders for this visit:    Class 1 obesity due to excess calories without serious comorbidity with body mass index (BMI) of 34.0 to 34.9 in adult  -     phentermine (ADIPEX-P) 37.5 MG tablet; Take 1 tablet by mouth every morning (before breakfast) for 30 days. Max Daily Amount: 37.5 mg    Onychomycosis  -     terbinafine (LAMISIL) 250 MG tablet; Take 1 tablet by mouth daily  -     ciclopirox (PENLAC) 8 % solution; Apply topically nightly. Bilateral impacted cerumen    Erectile dysfunction, unspecified erectile dysfunction type  -     tadalafil (CIALIS) 10 MG tablet; Take 1 tablet by mouth daily as needed for Erectile Dysfunction  -     Testosterone; Future    - start Adipex for weight loss-reviewed [place] board of pharmacy regulations  - ear lavage completed by nursing staff  - start Lamisil and Penlac  - start Cialis and obtain testosterone    Controlled Substance Monitoring:    Acute and Chronic Pain Monitoring:   RX Monitoring 3/6/2023   Periodic Controlled Substance Monitoring No signs of potential drug abuse or diversion identified. DISPOSITION    Return in about 4 weeks (around 4/3/2023) for obesity. Jana Manjarrez released without restrictions. PATIENT COUNSELING    Counseling was provided today regarding the following topics: Healthy eating habits, Regular exercise, substance abuse and healthy sleep habits. Jana Manjarrez received counseling on the following healthy behaviors: nutrition and exercise    Patient given educational materials on: See Attached    I have instructed Jana Manjarrez to complete a self tracking handout on none and instructed them to bring it with them to his next appointment. Barriers to learning and self management: none    Discussed use, benefit, and side effects of prescribed medications. Barriers to medication compliance addressed. All patient questions answered. Pt voiced understanding.        Electronically signed by VITOR Yung - CNP on 3/6/2023 at 9:08 AM

## 2023-03-07 LAB — TESTOST SERPL-MCNC: 479 NG/DL (ref 300–1080)

## 2023-03-08 ENCOUNTER — TELEPHONE (OUTPATIENT)
Dept: FAMILY MEDICINE CLINIC | Age: 33
End: 2023-03-08

## 2023-03-08 NOTE — TELEPHONE ENCOUNTER
----- Message from VITOR Collins CNP sent at 3/8/2023  7:38 AM EST -----  Let Hiwot العراقي know his testosterone level is normal at 479.

## 2023-03-21 ENCOUNTER — HOSPITAL ENCOUNTER (EMERGENCY)
Age: 33
Discharge: HOME | End: 2023-03-21
Payer: COMMERCIAL

## 2023-03-21 VITALS
OXYGEN SATURATION: 97 % | HEIGHT: 68 IN | SYSTOLIC BLOOD PRESSURE: 117 MMHG | HEART RATE: 104 BPM | WEIGHT: 223 LBS | RESPIRATION RATE: 18 BRPM | BODY MASS INDEX: 33.8 KG/M2 | DIASTOLIC BLOOD PRESSURE: 67 MMHG | TEMPERATURE: 98.4 F

## 2023-03-21 DIAGNOSIS — J06.9 VIRAL URI WITH COUGH: Primary | ICD-10-CM

## 2023-03-21 LAB
FLUAV AG SPEC QL: NEGATIVE
FLUBV AG SPEC QL: NEGATIVE
SARS-COV-2 RDRP RESP QL NAA+PROBE: NOT  DETECTED

## 2023-03-21 PROCEDURE — 87635 SARS-COV-2 COVID-19 AMP PRB: CPT

## 2023-03-21 PROCEDURE — 87804 INFLUENZA ASSAY W/OPTIC: CPT

## 2023-03-21 PROCEDURE — 99213 OFFICE O/P EST LOW 20 MIN: CPT

## 2023-03-21 PROCEDURE — 99212 OFFICE O/P EST SF 10 MIN: CPT | Performed by: NURSE PRACTITIONER

## 2023-03-21 ASSESSMENT — PAIN - FUNCTIONAL ASSESSMENT: PAIN_FUNCTIONAL_ASSESSMENT: NONE - DENIES PAIN

## 2023-03-21 NOTE — ED PROVIDER NOTES
[address]       Chief Complaint   Patient presents with    Cough    URI    Fatigue       Nurses Notes reviewed and I agree except as noted in the HPI. HISTORY OF PRESENT ILLNESS   Andrea Bruno is a 33 y.o. male who presents to urgent care complaining of cough, fatigue, congestion for approximately 3 days. Denies fevers. Chest pain. Denies shortness of breath. He is otherwise generally healthy. 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 REVIEW OF SYSTEMS     Review of Systems   Constitutional:  Positive for fatigue. HENT:  Positive for congestion. Respiratory:  Positive for cough. Negative for chest tightness and wheezing. Cardiovascular:  Negative for chest pain. PAST MEDICAL HISTORY         Diagnosis Date    Anxiety     Candida albicans infection     uses OTC medications    Carpal tunnel syndrome     sees doctor at [facility]; left wrist; Dr. Gege Hendrix    Depression     History of chicken pox        SURGICAL HISTORY     Patient  has no past surgical history on file. CURRENT MEDICATIONS       Discharge Medication List as of 3/21/2023  7:16 PM        CONTINUE these medications which have NOT CHANGED    Details   phentermine (ADIPEX-P) 37.5 MG tablet Take 1 tablet by mouth every morning (before breakfast) for 30 days.  Max Daily Amount: 37.5 mg, Disp-30 tablet, R-0Normal      terbinafine (LAMISIL) 250 MG tablet Take 1 tablet by mouth daily, Disp-84 tablet, R-0Normal      ciclopirox (PENLAC) 8 % solution Apply topically nightly., Disp-6 mL, R-3, Normal      tadalafil (CIALIS) 10 MG tablet Take 1 tablet by mouth daily as needed for Erectile Dysfunction, Disp-10 tablet, R-5Normal      Multiple Vitamin (MULTI VITAMIN DAILY PO) Take by mouth daily Taking with B6-in vitaminHistorical Med      Cholecalciferol (VITAMIN D3) 50 MCG (2000 UT) CAPS Take by mouth daily Historical Med

## 2023-03-21 NOTE — DISCHARGE INSTRUCTIONS
Fluids. Rest.  Take over-the-counter cough medications as needed. Report to ER with any new or severe symptoms such as shortness of breath or chest pain.

## 2023-03-21 NOTE — ED TRIAGE NOTES
Pt to [facility] ambulatory with a cough, URI s/s, and fatigue. This started on Saturday.

## 2023-03-22 ASSESSMENT — ENCOUNTER SYMPTOMS
CHEST TIGHTNESS: 0
WHEEZING: 0
COUGH: 1

## 2023-05-26 ENCOUNTER — OFFICE VISIT (OUTPATIENT)
Dept: FAMILY MEDICINE CLINIC | Age: 33
End: 2023-05-26
Payer: COMMERCIAL

## 2023-05-26 VITALS
RESPIRATION RATE: 16 BRPM | TEMPERATURE: 98 F | BODY MASS INDEX: 34.37 KG/M2 | SYSTOLIC BLOOD PRESSURE: 126 MMHG | HEIGHT: 68 IN | DIASTOLIC BLOOD PRESSURE: 78 MMHG | WEIGHT: 226.8 LBS | HEART RATE: 91 BPM | OXYGEN SATURATION: 94 %

## 2023-05-26 DIAGNOSIS — E66.09 CLASS 1 OBESITY DUE TO EXCESS CALORIES WITHOUT SERIOUS COMORBIDITY WITH BODY MASS INDEX (BMI) OF 34.0 TO 34.9 IN ADULT: Primary | ICD-10-CM

## 2023-05-26 PROCEDURE — 99214 OFFICE O/P EST MOD 30 MIN: CPT | Performed by: NURSE PRACTITIONER

## 2023-05-26 RX ORDER — BUPROPION HYDROCHLORIDE 100 MG/1
TABLET ORAL
Qty: 120 TABLET | Refills: 4 | Status: SHIPPED | OUTPATIENT
Start: 2023-05-26

## 2023-05-26 RX ORDER — BUPROPION HYDROCHLORIDE 100 MG/1
TABLET ORAL
Qty: 120 TABLET | Refills: 4 | Status: SHIPPED | OUTPATIENT
Start: 2023-05-26 | End: 2023-05-26

## 2023-05-26 NOTE — PROGRESS NOTES
[facility] at / [address] [facility]  [address] [facility]  Chief Complaint   Patient presents with    Follow-up     No concerns        History obtained from chart review and the patient. SUBJECTIVE:  Keny March is a 33 y.o. male that presents today for follow up obesity    Obesity  He took the Adipex but didn't like the way it made him feel. He did not lose weight. He is interested in trying Wellbutrin and Naltrexone (Contrave). Has done very well with Mounjaro with weight loss, but had difficulty getting it approved by insurance. He is still working on diet/exercise. 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 Age/Gender Health Maintenance    Lipid -   Lab Results   Component Value Date    CHOL 152 07/16/2020     Lab Results   Component Value Date    TRIG 73 07/16/2020     Lab Results   Component Value Date    HDL 47 10/03/2022    HDL 51 07/16/2020     Lab Results   Component Value Date    LDLCALC 125 10/03/2022    LDLCALC 86 07/16/2020     DM Screen -   Lab Results   Component Value Date    LABA1C 5.3 10/03/2022     No results found for: EAG  Colon Cancer Screening - 39  Lung Cancer Screening (Age 54 to [de-identified] with 30 pack year hx, current smoker or quit within past 15 years) - n/a    Tetanus - needs  Influenza Vaccine - yearly  Pneumonia Vaccine - 65  Zostavax - 50   HPV Vaccine - n/a    PSA testing discussion - 55  AAA Screening - n/a    Falls screening - n/a    Current Outpatient Medications   Medication Sig Dispense Refill    buPROPion (WELLBUTRIN) 100 MG tablet Take 1 tablet by mouth daily for 1 week, then take 1 tablet by mouth twice a day, then take 2 tablets by mouth twice a day.  120 tablet 4    Naltrexone HCl, Pain, 4.5 MG CAPS Take 1 capsule by mouth daily for 1 week, then take 1 capsule by mouth BID for 1 week, and then take 2 capsules by mouth BID for 1 week, then take 3 capsules by mouth BID for 1 week, and then take 4 capsules

## 2023-05-30 ENCOUNTER — TELEPHONE (OUTPATIENT)
Dept: FAMILY MEDICINE CLINIC | Age: 33
End: 2023-05-30

## 2023-05-30 DIAGNOSIS — E66.09 CLASS 1 OBESITY DUE TO EXCESS CALORIES WITHOUT SERIOUS COMORBIDITY WITH BODY MASS INDEX (BMI) OF 34.0 TO 34.9 IN ADULT: Primary | ICD-10-CM

## 2023-05-30 RX ORDER — NALTREXONE HYDROCHLORIDE 50 MG/1
TABLET, FILM COATED ORAL
Qty: 30 TABLET | Refills: 2 | Status: SHIPPED | OUTPATIENT
Start: 2023-05-30

## 2023-05-30 NOTE — TELEPHONE ENCOUNTER
[facility] with [facility] pharmacy states Naltrexone HCl, Pain, 4.5 MG CAPS is not able to be ordered      Naltrexone HCl, Pain, is available in 50 mg

## 2023-10-31 ENCOUNTER — OFFICE VISIT (OUTPATIENT)
Dept: FAMILY MEDICINE CLINIC | Age: 33
End: 2023-10-31

## 2023-10-31 VITALS
BODY MASS INDEX: 36.1 KG/M2 | OXYGEN SATURATION: 98 % | RESPIRATION RATE: 14 BRPM | TEMPERATURE: 98.5 F | WEIGHT: 238.2 LBS | HEART RATE: 80 BPM | SYSTOLIC BLOOD PRESSURE: 135 MMHG | HEIGHT: 68 IN | DIASTOLIC BLOOD PRESSURE: 82 MMHG

## 2023-10-31 DIAGNOSIS — E66.09 CLASS 2 OBESITY DUE TO EXCESS CALORIES WITHOUT SERIOUS COMORBIDITY WITH BODY MASS INDEX (BMI) OF 36.0 TO 36.9 IN ADULT: ICD-10-CM

## 2023-10-31 DIAGNOSIS — Z72.51 UNPROTECTED SEXUAL INTERCOURSE: Primary | ICD-10-CM

## 2023-10-31 DIAGNOSIS — N52.9 ERECTILE DYSFUNCTION, UNSPECIFIED ERECTILE DYSFUNCTION TYPE: ICD-10-CM

## 2023-10-31 PROCEDURE — 99214 OFFICE O/P EST MOD 30 MIN: CPT | Performed by: NURSE PRACTITIONER

## 2023-10-31 RX ORDER — TADALAFIL 10 MG/1
10 TABLET ORAL DAILY PRN
Qty: 10 TABLET | Refills: 5 | Status: SHIPPED | OUTPATIENT
Start: 2023-10-31

## 2023-10-31 RX ORDER — ONDANSETRON 4 MG/1
4 TABLET, ORALLY DISINTEGRATING ORAL 3 TIMES DAILY PRN
Qty: 21 TABLET | Refills: 0 | Status: SHIPPED | OUTPATIENT
Start: 2023-10-31

## 2023-10-31 RX ORDER — NALTREXONE HYDROCHLORIDE 50 MG/1
TABLET, FILM COATED ORAL
Qty: 30 TABLET | Refills: 2 | Status: SHIPPED | OUTPATIENT
Start: 2023-10-31

## 2023-10-31 NOTE — PROGRESS NOTES
[facility] at [address], [facility]  Chief Complaint   Patient presents with    Follow-up     Med refill        History obtained from chart review and the patient. SUBJECTIVE:  Daisy Donaldson is a 29 y.o. male that presents today for follow up obesity    Obesity  He took the Adipex but didn't like the way it made him feel. He also tried the Wellbutrin and Naltrexone but didn't like the way the Wellbutrin made him feel. He is interested in doing [place] through compound pharmacy. Diet has been fair. He is working on meal planning  He is going to the gym    Unprotected Sexual [place]  His long term girlfriend recently ghosted him  She was on birth control  They did have unprotected intercourse. He would like STD screening  Denies any symptoms    Age/Gender Health Maintenance    Lipid -   Lab Results   Component Value Date    CHOL 152 07/16/2020     Lab Results   Component Value Date    TRIG 73 07/16/2020     Lab Results   Component Value Date    HDL 47 10/03/2022    HDL 51 07/16/2020     Lab Results   Component Value Date    LDLCALC 125 10/03/2022    LDLCALC 86 07/16/2020     DM Screen -   Lab Results   Component Value Date    LABA1C 5.3 10/03/2022     No results found for: \"EAG\"  Colon Cancer Screening - 39  Lung Cancer Screening (Age 54 to 80 with 30 pack year hx, current smoker or quit within past 15 years) - n/a    Tetanus - needs  Influenza Vaccine - yearly  Pneumonia Vaccine - 65  Zostavax - 50   HPV Vaccine - n/a    PSA testing discussion - 55  AAA Screening - n/a    Falls screening - n/a    Current Outpatient Medications   Medication Sig Dispense Refill    naltrexone (DEPADE) 50 MG tablet Take 1/2 tablet by mouth twice a day.  30 tablet 2    tadalafil (CIALIS) 10 MG tablet Take 1 tablet by mouth daily as needed for Erectile Dysfunction 10 tablet 5    ondansetron (ZOFRAN-ODT) 4 MG disintegrating tablet Take 1 tablet by mouth 3 times daily as needed for Nausea or

## 2024-01-02 ENCOUNTER — TELEMEDICINE (OUTPATIENT)
Dept: FAMILY MEDICINE CLINIC | Age: 34
End: 2024-01-02

## 2024-01-02 DIAGNOSIS — E66.09 CLASS 2 OBESITY DUE TO EXCESS CALORIES WITHOUT SERIOUS COMORBIDITY WITH BODY MASS INDEX (BMI) OF 36.0 TO 36.9 IN ADULT: Primary | ICD-10-CM

## 2024-01-02 PROCEDURE — 99213 OFFICE O/P EST LOW 20 MIN: CPT | Performed by: NURSE PRACTITIONER

## 2024-01-02 ASSESSMENT — ENCOUNTER SYMPTOMS
NAUSEA: 0
VOMITING: 0
RHINORRHEA: 0
ABDOMINAL PAIN: 0
SHORTNESS OF BREATH: 0
SORE THROAT: 0
TROUBLE SWALLOWING: 0
COUGH: 0
COLOR CHANGE: 0
DIARRHEA: 0
EYE PAIN: 0
WHEEZING: 0
EYE REDNESS: 0

## 2024-01-02 NOTE — PROGRESS NOTES
2024    TELEHEALTH EVALUATION -- Audio/Visual    HPI:    Joe Seals (:  1990) has requested an audio/video evaluation for the following concern(s):    Obesity  Weight loss is about the same  He tried the Naltrexone and it did seem to help with weight loss and appetite suppression  He started with the whole dose and it has worked well for him  He does have some nausea, so he is going to get a pill cutter and cut it in half and just stick with half dose  He has gotten the Wegovy but wants to do the Naltrexone first  He is walking and taking stairs  He has gotten a gym membership and plans to start there also  Diet has been fair, trying to eat \"less\"  He has stopped caffeine    Review of Systems   Constitutional:  Negative for chills, diaphoresis and fatigue.   HENT:  Negative for congestion, rhinorrhea, sore throat and trouble swallowing.    Eyes:  Negative for pain, redness and visual disturbance.   Respiratory:  Negative for cough, shortness of breath and wheezing.    Cardiovascular:  Negative for chest pain, palpitations and leg swelling.   Gastrointestinal:  Negative for abdominal pain, diarrhea, nausea and vomiting.   Endocrine: Negative for polydipsia, polyphagia and polyuria.   Genitourinary:  Negative for decreased urine volume, dysuria, frequency and urgency.   Musculoskeletal:  Negative for arthralgias and myalgias.   Skin:  Negative for color change and rash.   Allergic/Immunologic: Negative for environmental allergies, food allergies and immunocompromised state.   Neurological:  Negative for dizziness, tremors, syncope and headaches.   Hematological: Negative.  Negative for adenopathy.   Psychiatric/Behavioral:  Negative for behavioral problems, confusion, self-injury and suicidal ideas.    All other systems reviewed and are negative.      Prior to Visit Medications    Medication Sig Taking? Authorizing Provider   naltrexone (DEPADE) 50 MG tablet Take 1/2 tablet by mouth twice a day.

## 2024-08-07 ENCOUNTER — TELEPHONE (OUTPATIENT)
Dept: FAMILY MEDICINE CLINIC | Age: 34
End: 2024-08-07

## 2024-08-07 LAB
CHLAMYDIA, NUC. ACID AMP: NOT DETECTED
HB: SOURCE: NORMAL
HIV 1+2 AB+HIV1P24 AG, EIA: NORMAL
N GONORRHOEAE AMPLIFIED DET: NOT DETECTED
TRICHOMONAS VAGINALIS RRNA: NOT DETECTED

## 2024-08-07 NOTE — TELEPHONE ENCOUNTER
----- Message from VITOR Cordero - CNP sent at 8/7/2024  8:31 AM EDT -----  Let Dr Seals know his HIV test was negative.

## 2024-08-07 NOTE — TELEPHONE ENCOUNTER
----- Message from VITOR Cordero - CNP sent at 8/7/2024  3:15 PM EDT -----  Let Dr Seals  know his chlamydia/gonorrhea and Trich STD tests are negative.

## 2024-08-08 NOTE — TELEPHONE ENCOUNTER
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

## 2024-08-08 NOTE — TELEPHONE ENCOUNTER
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